# Patient Record
Sex: MALE | Race: WHITE | NOT HISPANIC OR LATINO | Employment: FULL TIME | URBAN - METROPOLITAN AREA
[De-identification: names, ages, dates, MRNs, and addresses within clinical notes are randomized per-mention and may not be internally consistent; named-entity substitution may affect disease eponyms.]

---

## 2017-10-12 ENCOUNTER — APPOINTMENT (OUTPATIENT)
Dept: RADIOLOGY | Facility: HOSPITAL | Age: 57
DRG: 336 | End: 2017-10-12
Payer: COMMERCIAL

## 2017-10-12 ENCOUNTER — APPOINTMENT (EMERGENCY)
Dept: CT IMAGING | Facility: HOSPITAL | Age: 57
DRG: 336 | End: 2017-10-12
Payer: COMMERCIAL

## 2017-10-12 ENCOUNTER — HOSPITAL ENCOUNTER (INPATIENT)
Facility: HOSPITAL | Age: 57
LOS: 10 days | Discharge: HOME | DRG: 336 | End: 2017-10-22
Attending: EMERGENCY MEDICINE | Admitting: SURGERY
Payer: COMMERCIAL

## 2017-10-12 DIAGNOSIS — K56.609 SMALL BOWEL OBSTRUCTION (HCC): ICD-10-CM

## 2017-10-12 DIAGNOSIS — R10.9 ABDOMINAL PAIN: Primary | ICD-10-CM

## 2017-10-12 DIAGNOSIS — R11.2 NAUSEA & VOMITING: ICD-10-CM

## 2017-10-12 LAB
ALBUMIN SERPL BCP-MCNC: 4.1 G/DL (ref 3.5–5)
ALP SERPL-CCNC: 60 U/L (ref 46–116)
ALT SERPL W P-5'-P-CCNC: 21 U/L (ref 12–78)
ANION GAP SERPL CALCULATED.3IONS-SCNC: 10 MMOL/L (ref 4–13)
AST SERPL W P-5'-P-CCNC: 15 U/L (ref 5–45)
BASOPHILS # BLD AUTO: 0.01 THOUSANDS/ΜL (ref 0–0.1)
BASOPHILS NFR BLD AUTO: 0 % (ref 0–1)
BILIRUB SERPL-MCNC: 0.9 MG/DL (ref 0.2–1)
BUN SERPL-MCNC: 26 MG/DL (ref 5–25)
CALCIUM SERPL-MCNC: 9.4 MG/DL (ref 8.3–10.1)
CHLORIDE SERPL-SCNC: 98 MMOL/L (ref 100–108)
CO2 SERPL-SCNC: 29 MMOL/L (ref 21–32)
CREAT SERPL-MCNC: 0.91 MG/DL (ref 0.6–1.3)
EOSINOPHIL # BLD AUTO: 0 THOUSAND/ΜL (ref 0–0.61)
EOSINOPHIL NFR BLD AUTO: 0 % (ref 0–6)
ERYTHROCYTE [DISTWIDTH] IN BLOOD BY AUTOMATED COUNT: 13.2 % (ref 11.6–15.1)
GFR SERPL CREATININE-BSD FRML MDRD: 94 ML/MIN/1.73SQ M
GLUCOSE SERPL-MCNC: 125 MG/DL (ref 65–140)
HCT VFR BLD AUTO: 46.2 % (ref 36.5–49.3)
HGB BLD-MCNC: 15.4 G/DL (ref 12–17)
LIPASE SERPL-CCNC: 81 U/L (ref 73–393)
LYMPHOCYTES # BLD AUTO: 1.05 THOUSANDS/ΜL (ref 0.6–4.47)
LYMPHOCYTES NFR BLD AUTO: 12 % (ref 14–44)
MCH RBC QN AUTO: 29.2 PG (ref 26.8–34.3)
MCHC RBC AUTO-ENTMCNC: 33.3 G/DL (ref 31.4–37.4)
MCV RBC AUTO: 88 FL (ref 82–98)
MONOCYTES # BLD AUTO: 0.56 THOUSAND/ΜL (ref 0.17–1.22)
MONOCYTES NFR BLD AUTO: 6 % (ref 4–12)
NEUTROPHILS # BLD AUTO: 7.31 THOUSANDS/ΜL (ref 1.85–7.62)
NEUTS SEG NFR BLD AUTO: 82 % (ref 43–75)
PLATELET # BLD AUTO: 281 THOUSANDS/UL (ref 149–390)
PMV BLD AUTO: 9.4 FL (ref 8.9–12.7)
POTASSIUM SERPL-SCNC: 3.8 MMOL/L (ref 3.5–5.3)
PROT SERPL-MCNC: 7.8 G/DL (ref 6.4–8.2)
RBC # BLD AUTO: 5.27 MILLION/UL (ref 3.88–5.62)
SODIUM SERPL-SCNC: 137 MMOL/L (ref 136–145)
WBC # BLD AUTO: 8.93 THOUSAND/UL (ref 4.31–10.16)

## 2017-10-12 PROCEDURE — 96374 THER/PROPH/DIAG INJ IV PUSH: CPT

## 2017-10-12 PROCEDURE — 96361 HYDRATE IV INFUSION ADD-ON: CPT

## 2017-10-12 PROCEDURE — 83690 ASSAY OF LIPASE: CPT | Performed by: EMERGENCY MEDICINE

## 2017-10-12 PROCEDURE — 80053 COMPREHEN METABOLIC PANEL: CPT | Performed by: EMERGENCY MEDICINE

## 2017-10-12 PROCEDURE — 99285 EMERGENCY DEPT VISIT HI MDM: CPT

## 2017-10-12 PROCEDURE — 85025 COMPLETE CBC W/AUTO DIFF WBC: CPT | Performed by: EMERGENCY MEDICINE

## 2017-10-12 PROCEDURE — 74177 CT ABD & PELVIS W/CONTRAST: CPT

## 2017-10-12 PROCEDURE — 74000 HB X-RAY EXAM OF ABDOMEN (SINGLE ANTEROPOSTERIOR VIEW) (PORTABLE): CPT

## 2017-10-12 PROCEDURE — 96375 TX/PRO/DX INJ NEW DRUG ADDON: CPT

## 2017-10-12 PROCEDURE — 36415 COLL VENOUS BLD VENIPUNCTURE: CPT | Performed by: EMERGENCY MEDICINE

## 2017-10-12 RX ORDER — ONDANSETRON 2 MG/ML
4 INJECTION INTRAMUSCULAR; INTRAVENOUS EVERY 4 HOURS PRN
Status: DISCONTINUED | OUTPATIENT
Start: 2017-10-12 | End: 2017-10-22 | Stop reason: HOSPADM

## 2017-10-12 RX ORDER — SODIUM CHLORIDE, SODIUM LACTATE, POTASSIUM CHLORIDE, CALCIUM CHLORIDE 600; 310; 30; 20 MG/100ML; MG/100ML; MG/100ML; MG/100ML
125 INJECTION, SOLUTION INTRAVENOUS CONTINUOUS
Status: DISCONTINUED | OUTPATIENT
Start: 2017-10-12 | End: 2017-10-14

## 2017-10-12 RX ORDER — ASPIRIN 81 MG/1
81 TABLET ORAL DAILY
COMMUNITY

## 2017-10-12 RX ORDER — LORAZEPAM 2 MG/ML
1 INJECTION INTRAMUSCULAR ONCE
Status: COMPLETED | OUTPATIENT
Start: 2017-10-12 | End: 2017-10-12

## 2017-10-12 RX ORDER — ONDANSETRON 2 MG/ML
4 INJECTION INTRAMUSCULAR; INTRAVENOUS ONCE
Status: COMPLETED | OUTPATIENT
Start: 2017-10-12 | End: 2017-10-12

## 2017-10-12 RX ORDER — ACETAMINOPHEN, ASPIRIN AND CAFFEINE 250; 250; 65 MG/1; MG/1; MG/1
1 TABLET, FILM COATED ORAL EVERY 6 HOURS PRN
COMMUNITY

## 2017-10-12 RX ORDER — KETOROLAC TROMETHAMINE 30 MG/ML
15 INJECTION, SOLUTION INTRAMUSCULAR; INTRAVENOUS ONCE
Status: COMPLETED | OUTPATIENT
Start: 2017-10-12 | End: 2017-10-12

## 2017-10-12 RX ORDER — LISINOPRIL AND HYDROCHLOROTHIAZIDE 25; 20 MG/1; MG/1
1 TABLET ORAL DAILY
COMMUNITY

## 2017-10-12 RX ORDER — DIPHENHYDRAMINE HYDROCHLORIDE 50 MG/ML
25 INJECTION INTRAMUSCULAR; INTRAVENOUS EVERY 6 HOURS PRN
Status: DISCONTINUED | OUTPATIENT
Start: 2017-10-12 | End: 2017-10-22 | Stop reason: HOSPADM

## 2017-10-12 RX ORDER — METOPROLOL TARTRATE 5 MG/5ML
2.5 INJECTION INTRAVENOUS EVERY 6 HOURS PRN
Status: DISCONTINUED | OUTPATIENT
Start: 2017-10-12 | End: 2017-10-22 | Stop reason: HOSPADM

## 2017-10-12 RX ORDER — ONDANSETRON 4 MG/1
4 TABLET, ORALLY DISINTEGRATING ORAL EVERY 6 HOURS PRN
Qty: 15 TABLET | Refills: 0 | Status: SHIPPED | OUTPATIENT
Start: 2017-10-12

## 2017-10-12 RX ADMIN — SODIUM CHLORIDE 1000 ML: 0.9 INJECTION, SOLUTION INTRAVENOUS at 09:13

## 2017-10-12 RX ADMIN — IOHEXOL 100 ML: 350 INJECTION, SOLUTION INTRAVENOUS at 06:59

## 2017-10-12 RX ADMIN — ONDANSETRON 4 MG: 2 INJECTION INTRAMUSCULAR; INTRAVENOUS at 06:15

## 2017-10-12 RX ADMIN — SODIUM CHLORIDE, SODIUM LACTATE, POTASSIUM CHLORIDE, AND CALCIUM CHLORIDE 125 ML/HR: .6; .31; .03; .02 INJECTION, SOLUTION INTRAVENOUS at 11:05

## 2017-10-12 RX ADMIN — KETOROLAC TROMETHAMINE 15 MG: 30 INJECTION, SOLUTION INTRAMUSCULAR at 06:16

## 2017-10-12 RX ADMIN — ENOXAPARIN SODIUM 40 MG: 40 INJECTION SUBCUTANEOUS at 10:18

## 2017-10-12 RX ADMIN — ONDANSETRON 4 MG: 2 INJECTION INTRAMUSCULAR; INTRAVENOUS at 16:08

## 2017-10-12 RX ADMIN — SODIUM CHLORIDE, SODIUM LACTATE, POTASSIUM CHLORIDE, AND CALCIUM CHLORIDE 125 ML/HR: .6; .31; .03; .02 INJECTION, SOLUTION INTRAVENOUS at 19:44

## 2017-10-12 RX ADMIN — Medication 1 SPRAY: at 21:47

## 2017-10-12 RX ADMIN — SODIUM CHLORIDE 1000 ML: 0.9 INJECTION, SOLUTION INTRAVENOUS at 06:12

## 2017-10-12 RX ADMIN — DIPHENHYDRAMINE HYDROCHLORIDE 25 MG: 50 INJECTION, SOLUTION INTRAMUSCULAR; INTRAVENOUS at 20:57

## 2017-10-12 RX ADMIN — ONDANSETRON 4 MG: 2 INJECTION INTRAMUSCULAR; INTRAVENOUS at 20:20

## 2017-10-12 RX ADMIN — LORAZEPAM 1 MG: 2 INJECTION INTRAMUSCULAR; INTRAVENOUS at 10:19

## 2017-10-12 NOTE — CASE MANAGEMENT
[address] [place] [address] in the [facility] by Handy Aguilar for 2017  Network Utilization Review Department  Phone: [phone]; Fax [phone]  ATTENTION: The Network Utilization Review Department is now centralized for our 7 Facilities  Make a note that we have a new phone and fax numbers for our Department  Please call with any questions or concerns to [phone] and carefully follow the prompts so that you are directed to the right person  All voicemails are confidential  Fax any determinations, approvals, denials, and requests for initial or continue stay review clinical to [phone]  Due to HIGH CALL volume, it would be easier if you could please send faxed requests to expedite your requests and in part, help us provide discharge notifications faster  Initial Clinical Review    Admission: Date/Time/Statement:   Patient admitted as IP 10/12/17 @ 0840, changed to OBSERVATION 10/12/17 @ 0843, changed to IP 10/12/17 @ 1352      10/12/17 1352  Inpatient Admission Once     Transfer Service: Surgery-General       Question Answer Comment   Admitting Physician RAJIV LEONARD    Level of Care Med Surg    Estimated length of stay More than 2 Midnights    Certification I certify that inpatient services are medically necessary for this patient for a duration of greater than two midnights  See H&P and MD Progress Notes for additional information about the patient's course of treatment                  Orders Placed This Encounter   Procedures    Inpatient Admission     Standing Status:   Standing     Number of Occurrences:   1     Order Specific Question:   Admitting Physician     Answer:   RAJIV Aldrich [[id number]]     Order Specific Question:   Level of Care     Answer:   Med Surg [[id number]]     Order Specific Question:   Estimated length of stay     Answer:   More than 2 Midnights     Order Specific Question:   Certification     Answer:   I certify that inpatient services are medically necessary for this patient for a duration of greater than two midnights  See H&P and MD Progress Notes for additional information about the patient's course of treatment   Place in Observation (expected length of stay for this patient is less than two midnights)     Standing Status:   Standing     Number of Occurrences:   1     Order Specific Question:   Admitting Physician     Answer:   Polina Johnson [[id number]]     Order Specific Question:   Level of Care     Answer:   Med Surg [16]         ED: Date/Time/Mode of Arrival:   ED Arrival Information     Expected Arrival Acuity Means of Arrival Escorted By Service Admission Type    - 10/12/2017 05:21 Urgent Walk-In Self Surgery-General Urgent    Arrival Complaint    Stomach pain          Chief Complaint:   Chief Complaint   Patient presents with    Abdominal Pain     pt  states he eat some spaghetti 2 days ago and he has been nauseous, vomiting and havind some stomach ache ever since, pt  denies having any diarrhea       History of Illness: Ang Geiger is a 65y o  year old male with h/o HTN, appy who presents to ED complaining of epigastric abdominal pain x2 days associated with nausea and vomiting  Associated symptoms are decreased urine output, sweats, chills and no appetite  Previous surgeries include appendectomy  Last colonoscopy was 1-2 years ago and negative        ED Vital Signs:   ED Triage Vitals [10/12/17 0533]   Temperature Pulse Respirations Blood Pressure SpO2   98 8 °F (37 1 °C) 78 18 (!) 168/108 99 %      Temp Source Heart Rate Source Patient Position - Orthostatic VS BP Location FiO2 (%)   Oral Monitor Lying Right arm --      Pain Score       6        Wt Readings from Last 1 Encounters:   10/12/17 93 kg (205 lb)       Vital Signs (abnormal):    98 7 °F (37 1 °C)  76  18  140/82  98 %  None (Room air)         Abnormal Labs/Diagnostic Test Results:   LABS - cl 98, bun26,   ED Treatment:   Medication Administration from 10/12/2017 0521 to 10/12/2017 7968       Date/Time Order Dose Route Action Action by Comments     10/12/2017 0840 sodium chloride 0 9 % bolus 1,000 mL 0 mL Intravenous Stopped Edi Dillard RN      10/12/2017 0612 sodium chloride 0 9 % bolus 1,000 mL 1,000 mL Intravenous New [address] [facility] Gosselin, RN      10/12/2017 0615 ondansetron (ZOFRAN) injection 4 mg 4 mg Intravenous Given [facility] Gosselin, RN      10/12/2017 0616 ketorolac (TORADOL) 30 mg/mL injection 15 mg 15 mg Intravenous Given Gail Drew RN      10/12/2017 0659 iohexol (OMNIPAQUE) 350 MG/ML injection (MULTI-DOSE) 100 mL 100 mL Intravenous Given [facility]      10/12/2017 0913 sodium chloride 0 9 % bolus 1,000 mL 1,000 mL Intravenous New Bag Edi Dillard RN           Past Medical/Surgical History: Active Ambulatory Problems     Diagnosis Date Noted    No Active Ambulatory Problems     Resolved Ambulatory Problems     Diagnosis Date Noted    No Resolved Ambulatory Problems     Past Medical History:   Diagnosis Date    Hypertension     Migraine        Admitting Diagnosis: Small bowel obstruction [K56 609]  Abdominal pain [R10 9]  Nausea & vomiting [R11 2]    Age/Sex: 65 y o  male    Assessment/Plan: ASSESSMENT/PLAN:     SBO     NGT  IVF  OOB and ambulate  IS   Pain control   Ck AM obstruction series  IV HTN meds for now      This patient will require  >2 night hospital stay      Admission Orders:  Admit to med surg, NGT to LCS, oob as tolerated, encourage C&DB, IS q1h, NPO      Scheduled Meds:   enoxaparin 40 mg Subcutaneous Daily     Continuous Infusions:   lactated ringers 125 mL/hr Last Rate: 125 mL/hr (10/12/17 1105)     PRN Meds: HYDROmorphone    HYDROmorphone    metoprolol    ondansetron

## 2017-10-12 NOTE — ED PROVIDER NOTES
History  Chief Complaint   Patient presents with    Abdominal Pain     pt  states he eat some spaghetti 2 days ago and he has been nauseous, vomiting and havind some stomach ache ever since, pt  denies having any diarrhea       History provided by:  Patient and spouse   used: No     52-year-old male presents with 2 days of lower abdominal pain, or intensity without radiation associated with nausea  States he has been forcing himself to vomit but has not been making him feel better  He has had no bowel movements but has also not had anything solid to eat  Just a few sips of water  Recalls eating spaghetti from a restaurant in [address] [place] [address] the day prior to this, bringing it home and reheating in a Styrofoam container which had melted upon reheating  No other sick contacts  Subjective fever and chills  History of appendectomy  No other abdominal surgeries  Otherwise healthy  On exam here he seems a little uncomfortable he does have bilateral lower quadrant abdominal tenderness without rebound or guarding  Differential diagnosis includes viral illness, food-borne gastroenteritis, small-bowel obstruction  Will check labs, give fluids, control symptoms and CT  Prior to Admission Medications   Prescriptions Last Dose Informant Patient Reported? Taking?   aspirin (ECOTRIN LOW STRENGTH) 81 mg EC tablet   Yes Yes   Sig: Take 81 mg by mouth daily   aspirin-acetaminophen-caffeine (EXCEDRIN MIGRAINE) 250-250-65 MG per tablet   Yes Yes   Sig: Take 1 tablet by mouth every 6 (six) hours as needed for headaches   lisinopril-hydrochlorothiazide (PRINZIDE,ZESTORETIC) 20-25 MG per tablet   Yes Yes   Sig: Take 1 tablet by mouth daily      Facility-Administered Medications: None       Past Medical History:   Diagnosis Date    Hypertension     Migraine        Past Surgical History:   Procedure Laterality Date    APPENDECTOMY      CATARACT EXTRACTION      COLONOSCOPY         History reviewed   No pertinent family history  I have reviewed and agree with the history as documented  Social History   Substance Use Topics    Smoking status: Never Smoker    Smokeless tobacco: Not on file    Alcohol use Yes      Comment: occasionally        Review of Systems   Constitutional: Positive for activity change, appetite change and fatigue  Respiratory: Negative for chest tightness and shortness of breath  Gastrointestinal: Positive for abdominal pain, constipation, nausea and vomiting  Genitourinary: Negative for difficulty urinating, dysuria and flank pain  Skin: Negative for color change and pallor  Neurological: Negative for dizziness, weakness and headaches  All other systems reviewed and are negative  Physical Exam  ED Triage Vitals [10/12/17 0533]   Temperature Pulse Respirations Blood Pressure SpO2   98 8 °F (37 1 °C) 78 18 (!) 168/108 99 %      Temp Source Heart Rate Source Patient Position - Orthostatic VS BP Location FiO2 (%)   Oral Monitor Lying Right arm --      Pain Score       6           Physical Exam   Constitutional: He is oriented to person, place, and time  He appears well-developed and well-nourished  HENT:   Head: Normocephalic and atraumatic  Neck: Normal range of motion  Neck supple  Cardiovascular: Normal rate and regular rhythm  Pulmonary/Chest: Effort normal and breath sounds normal    Abdominal: Soft  He exhibits no distension  Tenderness in both lower quadrants w/o rebound/guarding  Musculoskeletal: Normal range of motion  He exhibits no edema  Neurological: He is alert and oriented to person, place, and time  Skin: Skin is warm and dry  Psychiatric: He has a normal mood and affect  His behavior is normal    Nursing note and vitals reviewed        ED Medications  Medications   sodium chloride 0 9 % bolus 1,000 mL (not administered)   lactated ringers infusion (not administered)   ondansetron (ZOFRAN) injection 4 mg (not administered)   enoxaparin (LOVENOX) subcutaneous injection 40 mg (not administered)   HYDROmorphone (DILAUDID) 1 mg/mL injection 0 5 mg (not administered)   HYDROmorphone (DILAUDID) 1 mg/mL injection 1 mg (not administered)   sodium chloride 0 9 % bolus 1,000 mL (0 mL Intravenous Stopped 10/12/17 0840)   ondansetron (ZOFRAN) injection 4 mg (4 mg Intravenous Given 10/12/17 0615)   ketorolac (TORADOL) 30 mg/mL injection 15 mg (15 mg Intravenous Given 10/12/17 0616)   iohexol (OMNIPAQUE) 350 MG/ML injection (MULTI-DOSE) 100 mL (100 mL Intravenous Given 10/12/17 0659)       Diagnostic Studies  Labs Reviewed   CBC AND DIFFERENTIAL - Abnormal        Result Value Ref Range Status    Neutrophils Relative 82 (*) 43 - 75 % Final    Lymphocytes Relative 12 (*) 14 - 44 % Final    WBC 8 93  4 31 - 10 16 Thousand/uL Final    RBC 5 27  3 88 - 5 62 Million/uL Final    Hemoglobin 15 4  12 0 - 17 0 g/dL Final    Hematocrit 46 2  36 5 - 49 3 % Final    MCV 88  82 - 98 fL Final    MCH 29 2  26 8 - 34 3 pg Final    MCHC 33 3  31 4 - 37 4 g/dL Final    RDW 13 2  11 6 - 15 1 % Final    MPV 9 4  8 9 - 12 7 fL Final    Platelets 589  364 - 390 Thousands/uL Final    Monocytes Relative 6  4 - 12 % Final    Eosinophils Relative 0  0 - 6 % Final    Basophils Relative 0  0 - 1 % Final    Neutrophils Absolute 7 31  1 85 - 7 62 Thousands/µL Final    Lymphocytes Absolute 1 05  0 60 - 4 47 Thousands/µL Final    Monocytes Absolute 0 56  0 17 - 1 22 Thousand/µL Final    Eosinophils Absolute 0 00  0 00 - 0 61 Thousand/µL Final    Basophils Absolute 0 01  0 00 - 0 10 Thousands/µL Final   COMPREHENSIVE METABOLIC PANEL - Abnormal     Chloride 98 (*) 100 - 108 mmol/L Final    BUN 26 (*) 5 - 25 mg/dL Final    Sodium 137  136 - 145 mmol/L Final    Potassium 3 8  3 5 - 5 3 mmol/L Final    CO2 29  21 - 32 mmol/L Final    Anion Gap 10  4 - 13 mmol/L Final    Creatinine 0 91  0 60 - 1 30 mg/dL Final    Comment: Standardized to IDMS reference method    Glucose 125  65 - 140 mg/dL Final    Comment: If the patient is fasting, the ADA then defines impaired fasting glucose as > 100 mg/dL and diabetes as > or equal to 123 mg/dL  Specimen collection should occur prior to Sulfasalazine administration due to the potential for falsely depressed results  Specimen collection should occur prior to Sulfapyridine administration due to the potential for falsely elevated results  Calcium 9 4  8 3 - 10 1 mg/dL Final    AST 15  5 - 45 U/L Final    Comment:   Specimen collection should occur prior to Sulfasalazine administration due to the potential for falsely depressed results  ALT 21  12 - 78 U/L Final    Comment:   Specimen collection should occur prior to Sulfasalazine administration due to the potential for falsely depressed results  Alkaline Phosphatase 60  46 - 116 U/L Final    Total Protein 7 8  6 4 - 8 2 g/dL Final    Albumin 4 1  3 5 - 5 0 g/dL Final    Total Bilirubin 0 90  0 20 - 1 00 mg/dL Final    eGFR 94  ml/min/1 73sq m Final    Narrative:     National Kidney Disease Education Program recommendations are as follows:  GFR calculation is accurate only with a steady state creatinine  Chronic Kidney disease less than 60 ml/min/1 73 sq  meters  Kidney failure less than 15 ml/min/1 73 sq  meters  LIPASE - Normal    Lipase 81  73 - 393 u/L Final   PLATELET COUNT       CT abdomen pelvis with contrast   Final Result      1  Dilated mid small bowel loops with decompressed distal loops  The findings are compatible with small bowel obstruction  Findings discussed with Dr Sandeep Parr at 0730 hours on 10/12/2017  2   Moderate amount of feces in the colon           Workstation performed: [id number]         XR ABDOMEN OBSTRUCTION SERIES    (Results Pending)       Procedures  Procedures      Phone Contacts  ED Phone Contact    ED Course  ED Course as of Oct 12 0843   Thu Oct 12, 2017   0152 This case was discussed with the surgical resident who will be down to evaluate the patient in the emergency department  9670 Patient to be admitted to the surgical service  [facility]  CritCare Time    Disposition  Final diagnoses:   Abdominal pain   Nausea & vomiting   Small bowel obstruction     ED Disposition     ED Disposition Condition Comment    Admit  Case was discussed with Darwin-Surgical Resident and the patient's admission status was agreed to be Admission Status: observation status to the service of Dr Easton Reis    None       Patient's Medications   Discharge Prescriptions    ONDANSETRON (ZOFRAN-ODT) 4 MG DISINTEGRATING TABLET    Take 1 tablet by mouth every 6 (six) hours as needed for nausea or vomiting       Start Date: 10/12/2017End Date: --       Order Dose: 4 mg       Quantity: 15 tablet    Refills: 0     No discharge procedures on file      ED Provider  Electronically Signed by       Chana Cortez MD  10/12/17 0310

## 2017-10-12 NOTE — H&P
History and Physical -General Surgery  Stormy Escobar 65 y o  male MRN: [id number]  Unit/Bed#: ED 12 Encounter: [id number]        Reason for Consult / Principal Problem: abdominal pain    HPI: Stormy Escobar is a 65y o  year old male with h/o HTN, appy who presents to ED complaining of epigastric abdominal pain x2 days associated with nausea and vomiting  Associated symptoms are decreased urine output, sweats, chills and no appetite  Previous surgeries include appendectomy  Last colonoscopy was 1-2 years ago and negative  CT A/P shows dilated mid small bowel loops with decompressed distal loops  The findings are compatible with small bowel obstruction  Review of Systems   Constitutional: Positive for activity change, appetite change, chills and diaphoresis  Respiratory: Negative for shortness of breath  Cardiovascular: Negative for chest pain  Gastrointestinal: Positive for abdominal pain, nausea and vomiting  Negative for abdominal distention, constipation and diarrhea  Skin: Negative for color change  Historical Information   Past Medical History:   Diagnosis Date    Hypertension     Migraine      Past Surgical History:   Procedure Laterality Date    APPENDECTOMY      CATARACT EXTRACTION Bilateral     COLONOSCOPY      NERVE SURGERY      B radial nerve transposition     Social History   History   Alcohol Use    Yes     Comment: occasionally     History   Drug Use No     History   Smoking Status    Never Smoker   Smokeless Tobacco    Never Used     History reviewed  No pertinent family history      Meds/Allergies     Home medications:   Lisinopril/HCTZ 20/25 mg  ASA 81 mg    Current Facility-Administered Medications   Medication Dose Route Frequency    enoxaparin (LOVENOX) subcutaneous injection 40 mg  40 mg Subcutaneous Daily    HYDROmorphone (DILAUDID) 1 mg/mL injection 0 5 mg  0 5 mg Intravenous Q2H PRN    HYDROmorphone (DILAUDID) 1 mg/mL injection 1 mg  1 mg Intravenous Q3H PRN  lactated ringers infusion  125 mL/hr Intravenous Continuous    ondansetron (ZOFRAN) injection 4 mg  4 mg Intravenous Q4H PRN    sodium chloride 0 9 % bolus 1,000 mL  1,000 mL Intravenous Once       No Known Allergies    Objective     Blood pressure (!) 168/108, pulse 78, temperature 98 8 °F (37 1 °C), temperature source Oral, resp  rate 18, weight 93 kg (205 lb), SpO2 99 %    No intake or output data in the 24 hours ending 10/12/17 0855    PHYSICAL EXAM  General appearance: alert and no distress  Skin: Skin color, texture, turgor normal  No rashes or lesions  Head: Normocephalic, without obvious abnormality  Heart: regular rate and rhythm, S1, S2 normal, no murmur, click, rub or gallop  Lungs: clear to auscultation bilaterally  Abdomen: soft and flat, tender in suprapubic area, no masses, +BS  Back: negative  Rectal: deferred  Neurological: normal without focal findings    Lab Results:   Admission on 10/12/2017   Component Date Value    WBC 10/12/2017 8 93     RBC 10/12/2017 5 27     Hemoglobin 10/12/2017 15 4     Hematocrit 10/12/2017 46 2     MCV 10/12/2017 88     MCH 10/12/2017 29 2     MCHC 10/12/2017 33 3     RDW 10/12/2017 13 2     MPV 10/12/2017 9 4     Platelets 54/06/5300 281     Neutrophils Relative 10/12/2017 82*    Lymphocytes Relative 10/12/2017 12*    Monocytes Relative 10/12/2017 6     Eosinophils Relative 10/12/2017 0     Basophils Relative 10/12/2017 0     Neutrophils Absolute 10/12/2017 7 31     Lymphocytes Absolute 10/12/2017 1 05     Monocytes Absolute 10/12/2017 0 56     Eosinophils Absolute 10/12/2017 0 00     Basophils Absolute 10/12/2017 0 01     Sodium 10/12/2017 137     Potassium 10/12/2017 3 8     Chloride 10/12/2017 98*    CO2 10/12/2017 29     Anion Gap 10/12/2017 10     BUN 10/12/2017 26*    Creatinine 10/12/2017 0 91     Glucose 10/12/2017 125     Calcium 10/12/2017 9 4     AST 10/12/2017 15     ALT 10/12/2017 21     Alkaline Phosphatase 10/12/2017 60     Total Protein 10/12/2017 7 8     Albumin 10/12/2017 4 1     Total Bilirubin 10/12/2017 0 90     eGFR 10/12/2017 94     Lipase 10/12/2017 81      Imaging Studies: I have personally reviewed pertinent reports  ASSESSMENT/PLAN:    SBO    NGT  IVF  OOB and ambulate  IS   Pain control   Ck AM obstruction series  IV HTN meds for now  This patient will require  >2 night hospital stay  Counseling / Coordination of Care  Total time spent today  30 minutes  Greater than 50% of total time was spent with the patient and / or family counseling and / or coordination of care

## 2017-10-13 ENCOUNTER — APPOINTMENT (INPATIENT)
Dept: RADIOLOGY | Facility: HOSPITAL | Age: 57
DRG: 336 | End: 2017-10-13
Payer: COMMERCIAL

## 2017-10-13 LAB
ANION GAP SERPL CALCULATED.3IONS-SCNC: 7 MMOL/L (ref 4–13)
BASOPHILS # BLD AUTO: 0.01 THOUSANDS/ΜL (ref 0–0.1)
BASOPHILS NFR BLD AUTO: 0 % (ref 0–1)
BUN SERPL-MCNC: 26 MG/DL (ref 5–25)
CALCIUM SERPL-MCNC: 8.8 MG/DL (ref 8.3–10.1)
CHLORIDE SERPL-SCNC: 103 MMOL/L (ref 100–108)
CO2 SERPL-SCNC: 28 MMOL/L (ref 21–32)
CREAT SERPL-MCNC: 0.86 MG/DL (ref 0.6–1.3)
EOSINOPHIL # BLD AUTO: 0.02 THOUSAND/ΜL (ref 0–0.61)
EOSINOPHIL NFR BLD AUTO: 0 % (ref 0–6)
ERYTHROCYTE [DISTWIDTH] IN BLOOD BY AUTOMATED COUNT: 13.2 % (ref 11.6–15.1)
GFR SERPL CREATININE-BSD FRML MDRD: 97 ML/MIN/1.73SQ M
GLUCOSE SERPL-MCNC: 101 MG/DL (ref 65–140)
HCT VFR BLD AUTO: 44.8 % (ref 36.5–49.3)
HGB BLD-MCNC: 14.5 G/DL (ref 12–17)
LYMPHOCYTES # BLD AUTO: 0.95 THOUSANDS/ΜL (ref 0.6–4.47)
LYMPHOCYTES NFR BLD AUTO: 12 % (ref 14–44)
MAGNESIUM SERPL-MCNC: 2 MG/DL (ref 1.6–2.6)
MCH RBC QN AUTO: 28.7 PG (ref 26.8–34.3)
MCHC RBC AUTO-ENTMCNC: 32.4 G/DL (ref 31.4–37.4)
MCV RBC AUTO: 89 FL (ref 82–98)
MONOCYTES # BLD AUTO: 0.8 THOUSAND/ΜL (ref 0.17–1.22)
MONOCYTES NFR BLD AUTO: 10 % (ref 4–12)
NEUTROPHILS # BLD AUTO: 5.91 THOUSANDS/ΜL (ref 1.85–7.62)
NEUTS SEG NFR BLD AUTO: 78 % (ref 43–75)
PHOSPHATE SERPL-MCNC: 3.5 MG/DL (ref 2.7–4.5)
PLATELET # BLD AUTO: 254 THOUSANDS/UL (ref 149–390)
PMV BLD AUTO: 9.6 FL (ref 8.9–12.7)
POTASSIUM SERPL-SCNC: 3.4 MMOL/L (ref 3.5–5.3)
RBC # BLD AUTO: 5.05 MILLION/UL (ref 3.88–5.62)
SODIUM SERPL-SCNC: 138 MMOL/L (ref 136–145)
WBC # BLD AUTO: 7.69 THOUSAND/UL (ref 4.31–10.16)

## 2017-10-13 PROCEDURE — 80048 BASIC METABOLIC PNL TOTAL CA: CPT | Performed by: SURGERY

## 2017-10-13 PROCEDURE — 84100 ASSAY OF PHOSPHORUS: CPT | Performed by: SURGERY

## 2017-10-13 PROCEDURE — 85025 COMPLETE CBC W/AUTO DIFF WBC: CPT | Performed by: SURGERY

## 2017-10-13 PROCEDURE — 74022 RADEX COMPL AQT ABD SERIES: CPT

## 2017-10-13 PROCEDURE — 83735 ASSAY OF MAGNESIUM: CPT | Performed by: SURGERY

## 2017-10-13 RX ORDER — POTASSIUM CHLORIDE 14.9 MG/ML
20 INJECTION INTRAVENOUS
Status: COMPLETED | OUTPATIENT
Start: 2017-10-13 | End: 2017-10-13

## 2017-10-13 RX ORDER — METOCLOPRAMIDE HYDROCHLORIDE 5 MG/ML
10 INJECTION INTRAMUSCULAR; INTRAVENOUS EVERY 6 HOURS PRN
Status: DISCONTINUED | OUTPATIENT
Start: 2017-10-13 | End: 2017-10-22 | Stop reason: HOSPADM

## 2017-10-13 RX ADMIN — POTASSIUM CHLORIDE 20 MEQ: 200 INJECTION, SOLUTION INTRAVENOUS at 09:19

## 2017-10-13 RX ADMIN — Medication 1 SPRAY: at 17:13

## 2017-10-13 RX ADMIN — METOCLOPRAMIDE 10 MG: 5 INJECTION, SOLUTION INTRAMUSCULAR; INTRAVENOUS at 14:10

## 2017-10-13 RX ADMIN — METOCLOPRAMIDE 10 MG: 5 INJECTION, SOLUTION INTRAMUSCULAR; INTRAVENOUS at 07:58

## 2017-10-13 RX ADMIN — ENOXAPARIN SODIUM 40 MG: 40 INJECTION SUBCUTANEOUS at 08:01

## 2017-10-13 RX ADMIN — DIPHENHYDRAMINE HYDROCHLORIDE 25 MG: 50 INJECTION, SOLUTION INTRAMUSCULAR; INTRAVENOUS at 04:58

## 2017-10-13 RX ADMIN — ONDANSETRON 4 MG: 2 INJECTION INTRAMUSCULAR; INTRAVENOUS at 16:10

## 2017-10-13 RX ADMIN — POTASSIUM CHLORIDE 20 MEQ: 200 INJECTION, SOLUTION INTRAVENOUS at 11:32

## 2017-10-13 RX ADMIN — SODIUM CHLORIDE, SODIUM LACTATE, POTASSIUM CHLORIDE, AND CALCIUM CHLORIDE 125 ML/HR: .6; .31; .03; .02 INJECTION, SOLUTION INTRAVENOUS at 11:24

## 2017-10-13 RX ADMIN — SODIUM CHLORIDE, SODIUM LACTATE, POTASSIUM CHLORIDE, AND CALCIUM CHLORIDE 125 ML/HR: .6; .31; .03; .02 INJECTION, SOLUTION INTRAVENOUS at 03:01

## 2017-10-13 RX ADMIN — ONDANSETRON 4 MG: 2 INJECTION INTRAMUSCULAR; INTRAVENOUS at 00:47

## 2017-10-13 RX ADMIN — ONDANSETRON 4 MG: 2 INJECTION INTRAMUSCULAR; INTRAVENOUS at 04:58

## 2017-10-13 RX ADMIN — Medication 1 SPRAY: at 05:00

## 2017-10-13 RX ADMIN — METOCLOPRAMIDE 10 MG: 5 INJECTION, SOLUTION INTRAMUSCULAR; INTRAVENOUS at 19:53

## 2017-10-13 RX ADMIN — Medication 1 SPRAY: at 00:47

## 2017-10-13 NOTE — PROGRESS NOTES
Progress Note -Surgery DIAMOND Frost 65 y o  male MRN: [id number]  Unit/Bed#: -01 Encounter: [id number]      Subjective/Objective     Subjective:  Patient states that he has been nauseous overnight  NG tube output 2 L in last 24 hours  Patient is not passing gas or having bowel movements      Objective:     /92   Pulse 91   Temp 98 1 °F (36 7 °C) (Oral)   Resp 20   Wt 93 kg (205 lb)   SpO2 94%       Intake/Output Summary (Last 24 hours) at 10/13/17 0837  Last data filed at 10/13/17 0715   Gross per 24 hour   Intake          1960 42 ml   Output             2075 ml   Net          -114 58 ml       Invasive Devices     Peripheral Intravenous Line            Peripheral IV 10/12/17 Left Antecubital 1 day          Drain            NG/OG/Enteral Tube Nasogastric 16 Fr Right nares less than 1 day                Physical Exam:  General appearance: alert, appears stated age and cooperative  Lungs: clear to auscultation bilaterally  Heart: regular rate and rhythm, S1, S2 normal, no murmur, click, rub or gallop  Abdomen:  Mild distention, nontender to palpation, bowel sounds present      Current Facility-Administered Medications:     diphenhydrAMINE (BENADRYL) injection 25 mg, 25 mg, Intravenous, Q6H PRN, Kandy Cespedes PA-C, 25 mg at 10/13/17 0458    enoxaparin (LOVENOX) subcutaneous injection 40 mg, 40 mg, Subcutaneous, Daily, Vi Kaba MD, 40 mg at 10/13/17 0801    HYDROmorphone (DILAUDID) 1 mg/mL injection 0 5 mg, 0 5 mg, Intravenous, Q2H PRN, Vi Kaba MD    HYDROmorphone (DILAUDID) 1 mg/mL injection 1 mg, 1 mg, Intravenous, Q3H PRN, Vi Kaba MD    lactated ringers infusion, 125 mL/hr, Intravenous, Continuous, Vi Kaba MD, Last Rate: 125 mL/hr at 10/13/17 0301, 125 mL/hr at 10/13/17 0301    metoclopramide (REGLAN) injection 10 mg, 10 mg, Intravenous, Q6H PRN, Chloe Ocampo PA-C, 10 mg at 10/13/17 0758    metoprolol (LOPRESSOR) injection 2 5 mg, 2 5 mg, Intravenous, Q6H PRN, Shaheen Perdomo PA-C    ondansetron [facility]) injection 4 mg, 4 mg, Intravenous, Q4H PRN, Krystal Welsh MD, 4 mg at 10/13/17 0458    phenol (CHLORASEPTIC) 1 4 % mucosal liquid 1 spray, 1 spray, Mouth/Throat, Q2H PRN, Alla Brooks PA-C, 1 spray at 10/13/17 0500    potassium chloride 20 mEq IVPB (premix), 20 mEq, Intravenous, Q2H, Krystal Welsh MD              Lab, Imaging and other studies:  I have personally reviewed pertinent lab results  , CBC:   Lab Results   Component Value Date    WBC 7 69 10/13/2017    HGB 14 5 10/13/2017    HCT 44 8 10/13/2017    MCV 89 10/13/2017     10/13/2017    MCH 28 7 10/13/2017    MCHC 32 4 10/13/2017    RDW 13 2 10/13/2017    MPV 9 6 10/13/2017   , CMP:   Lab Results   Component Value Date     10/13/2017    K 3 4 (L) 10/13/2017     10/13/2017    CO2 28 10/13/2017    ANIONGAP 7 10/13/2017    BUN 26 (H) 10/13/2017    CREATININE 0 86 10/13/2017    GLUCOSE 101 10/13/2017    CALCIUM 8 8 10/13/2017    EGFR 97 10/13/2017     Labs in chart were reviewed  Lab Results   Component Value Date    WBC 7 69 10/13/2017    HGB 14 5 10/13/2017    HCT 44 8 10/13/2017     10/13/2017     Lab Results   Component Value Date     10/13/2017    K 3 4 (L) 10/13/2017     10/13/2017    CO2 28 10/13/2017    BUN 26 (H) 10/13/2017    CREATININE 0 86 10/13/2017    GLUCOSE 101 10/13/2017       VTE Pharmacologic Prophylaxis: Enoxaparin (Lovenox)  VTE Mechanical Prophylaxis: sequential compression device    Assessment:  50-year-old male with small-bowel obstruction    Plan:  -will continue NG tube  -IV fluids/pain control  -follow-up final read of obstruction series from this a m     We will order  -encourage out of bed/ambulation  -DVT prophylaxis    Kathrin Fields PA-C

## 2017-10-13 NOTE — MEDICAL STUDENT
MEDICAL STUDENT  Inpatient Progress Note for TRAINING ONLY  Not Part of Legal Medical Record       Progress Note - Heather Dugan 65 y o  male MRN: [id number]    Unit/Bed#: -01 Encounter: [id number]      Assessment:  SBO    Plan:  Monitor NGT  IVF  OOB and ambulate  Encourage IS use  Continue IV HTN medications  Pain control  NPO  Review obstruction series results from this morning    Subjective:   severe nausea - taking Zofran Q4 hours with no relief  (+) nausea, and one episode of vomiting around his NGT this morning   (-) BM/flatus  Took two walks around the floor yesterday  (-) IS use    Objective:     Vitals: Blood pressure 140/92, pulse 91, temperature 98 1 °F (36 7 °C), temperature source Oral, resp  rate 20, weight 93 kg (205 lb), SpO2 94 %  ,There is no height or weight on file to calculate BMI  Intake/Output Summary (Last 24 hours) at 10/13/17 4086  Last data filed at 10/13/17 0715   Gross per 24 hour   Intake          1960 42 ml   Output             2075 ml   Net          -114 58 ml     NGT output:   10/12/17: 975 ml  10/13/17 (as of 0800) : 500 ml    Physical Exam: General appearance: alert, appears stated age, cooperative and moderate distress  Lungs: clear to auscultation bilaterally  Heart: regular rate and rhythm, S1, S2 normal, no murmur, click, rub or gallop  Abdomen: Abdomen is mildly distended  Patient is tender to palpation of the RLQ and LLQs  Hypoactive bowel sounds noted in all four quadrants  Extremities: extremities normal, atraumatic, no cyanosis or edema     Invasive Devices     Peripheral Intravenous Line            Peripheral IV 10/12/17 Left Antecubital 1 day          Drain            NG/OG/Enteral Tube Nasogastric 16 Fr Right nares less than 1 day                Lab, Imaging and other studies: I have personally reviewed pertinent reports      VTE Pharmacologic Prophylaxis: Enoxaparin (Lovenox)

## 2017-10-14 ENCOUNTER — APPOINTMENT (INPATIENT)
Dept: RADIOLOGY | Facility: HOSPITAL | Age: 57
DRG: 336 | End: 2017-10-14
Payer: COMMERCIAL

## 2017-10-14 LAB
ANION GAP SERPL CALCULATED.3IONS-SCNC: 7 MMOL/L (ref 4–13)
BUN SERPL-MCNC: 25 MG/DL (ref 5–25)
CALCIUM SERPL-MCNC: 8.5 MG/DL (ref 8.3–10.1)
CHLORIDE SERPL-SCNC: 104 MMOL/L (ref 100–108)
CO2 SERPL-SCNC: 29 MMOL/L (ref 21–32)
CREAT SERPL-MCNC: 0.77 MG/DL (ref 0.6–1.3)
ERYTHROCYTE [DISTWIDTH] IN BLOOD BY AUTOMATED COUNT: 13.1 % (ref 11.6–15.1)
GFR SERPL CREATININE-BSD FRML MDRD: 101 ML/MIN/1.73SQ M
GLUCOSE SERPL-MCNC: 102 MG/DL (ref 65–140)
HCT VFR BLD AUTO: 41.6 % (ref 36.5–49.3)
HGB BLD-MCNC: 13.6 G/DL (ref 12–17)
MCH RBC QN AUTO: 29.2 PG (ref 26.8–34.3)
MCHC RBC AUTO-ENTMCNC: 32.7 G/DL (ref 31.4–37.4)
MCV RBC AUTO: 89 FL (ref 82–98)
PLATELET # BLD AUTO: 217 THOUSANDS/UL (ref 149–390)
PMV BLD AUTO: 9.5 FL (ref 8.9–12.7)
POTASSIUM SERPL-SCNC: 3.6 MMOL/L (ref 3.5–5.3)
RBC # BLD AUTO: 4.66 MILLION/UL (ref 3.88–5.62)
SODIUM SERPL-SCNC: 140 MMOL/L (ref 136–145)
WBC # BLD AUTO: 7.12 THOUSAND/UL (ref 4.31–10.16)

## 2017-10-14 PROCEDURE — C9113 INJ PANTOPRAZOLE SODIUM, VIA: HCPCS | Performed by: SURGERY

## 2017-10-14 PROCEDURE — 85027 COMPLETE CBC AUTOMATED: CPT | Performed by: SURGERY

## 2017-10-14 PROCEDURE — 74022 RADEX COMPL AQT ABD SERIES: CPT

## 2017-10-14 PROCEDURE — 80048 BASIC METABOLIC PNL TOTAL CA: CPT | Performed by: SURGERY

## 2017-10-14 RX ORDER — POTASSIUM CHLORIDE 14.9 MG/ML
20 INJECTION INTRAVENOUS
Status: COMPLETED | OUTPATIENT
Start: 2017-10-14 | End: 2017-10-19

## 2017-10-14 RX ORDER — DEXTROSE, SODIUM CHLORIDE, AND POTASSIUM CHLORIDE 5; .45; .15 G/100ML; G/100ML; G/100ML
125 INJECTION INTRAVENOUS CONTINUOUS
Status: DISCONTINUED | OUTPATIENT
Start: 2017-10-14 | End: 2017-10-20

## 2017-10-14 RX ORDER — PANTOPRAZOLE SODIUM 40 MG/1
40 INJECTION, POWDER, FOR SOLUTION INTRAVENOUS
Status: DISCONTINUED | OUTPATIENT
Start: 2017-10-14 | End: 2017-10-21

## 2017-10-14 RX ADMIN — DEXTROSE, SODIUM CHLORIDE, AND POTASSIUM CHLORIDE 125 ML/HR: 5; .45; .15 INJECTION INTRAVENOUS at 11:39

## 2017-10-14 RX ADMIN — DEXTROSE, SODIUM CHLORIDE, AND POTASSIUM CHLORIDE 125 ML/HR: 5; .45; .15 INJECTION INTRAVENOUS at 20:10

## 2017-10-14 RX ADMIN — ENOXAPARIN SODIUM 40 MG: 40 INJECTION SUBCUTANEOUS at 08:08

## 2017-10-14 RX ADMIN — POTASSIUM CHLORIDE 20 MEQ: 200 INJECTION, SOLUTION INTRAVENOUS at 11:39

## 2017-10-14 RX ADMIN — POTASSIUM CHLORIDE 20 MEQ: 200 INJECTION, SOLUTION INTRAVENOUS at 13:44

## 2017-10-14 RX ADMIN — PANTOPRAZOLE SODIUM 40 MG: 40 INJECTION, POWDER, FOR SOLUTION INTRAVENOUS at 11:39

## 2017-10-14 RX ADMIN — SODIUM CHLORIDE, SODIUM LACTATE, POTASSIUM CHLORIDE, AND CALCIUM CHLORIDE 1000 ML: .6; .31; .03; .02 INJECTION, SOLUTION INTRAVENOUS at 09:38

## 2017-10-14 NOTE — PROGRESS NOTES
Progress Note - General Surgery   Heather Dugan 65 y o  male MRN: [id number]  Unit/Bed#: -01 Encounter: [id number]    Assessment:  66 yo M with SBO likely 2/2 adhesions    Plan:  SBO- con't NPO/NGT for another 24 hrs  Would expect him to open up over the next 24 hrs, but may need operative intervention if not  Will recheck OBS tomorrow  Give 1L bolus for tachycardia and low UOP    HTN- hold home meds, con't to monitor    PPX- lovenox, will add protonix as well  Subjective/Objective   Chief Complaint: abd pain    Subjective: still with abd soreness, not feeling great  Very small flatus, no BM  Mild nausea with ice chips  Objective:     Blood pressure 137/84, pulse 72, temperature 98 9 °F (37 2 °C), temperature source Oral, resp  rate 18, weight 93 kg (205 lb), SpO2 96 %  ,There is no height or weight on file to calculate BMI  Intake/Output Summary (Last 24 hours) at 10/14/17 1050  Last data filed at 10/14/17 0933   Gross per 24 hour   Intake              180 ml   Output             1650 ml   Net            -1470 ml       Invasive Devices     Peripheral Intravenous Line            Peripheral IV 10/12/17 Left Antecubital 2 days          Drain            NG/OG/Enteral Tube Nasogastric 16 Fr Right nares 2 days                Physical Exam:   gen- in bed, tired appearing  Heart- tachy to low 100s  Lungs- clear  abd- soft, mildly distended and tender, dec bowel sounds   NG bilious/brown  extr- no c/c/e    Lab, Imaging and other studies:  CBC:   Lab Results   Component Value Date    WBC 7 12 10/14/2017    HGB 13 6 10/14/2017    HCT 41 6 10/14/2017    MCV 89 10/14/2017     10/14/2017    MCH 29 2 10/14/2017    MCHC 32 7 10/14/2017    RDW 13 1 10/14/2017    MPV 9 5 10/14/2017   , CMP:   Lab Results   Component Value Date     10/14/2017    K 3 6 10/14/2017     10/14/2017    CO2 29 10/14/2017    ANIONGAP 7 10/14/2017    BUN 25 10/14/2017    CREATININE 0 77 10/14/2017    GLUCOSE 102 10/14/2017 CALCIUM 8 5 10/14/2017    EGFR 101 10/14/2017     VTE Pharmacologic Prophylaxis: Enoxaparin (Lovenox)  VTE Mechanical Prophylaxis: sequential compression device

## 2017-10-14 NOTE — PLAN OF CARE
Problem: GASTROINTESTINAL - ADULT  Goal: Minimal or absence of nausea and/or vomiting  INTERVENTIONS:  - Administer IV fluids as ordered to ensure adequate hydration  - Maintain NPO status until nausea and vomiting are resolved  - Nasogastric tube as ordered  - Administer ordered antiemetic medications as needed  - Provide nonpharmacologic comfort measures as appropriate  - Advance diet as tolerated, if ordered  - Nutrition services referral to assist patient with adequate nutrition and appropriate food choices   Outcome: Progressing    Goal: Maintains or returns to baseline bowel function  INTERVENTIONS:  - Assess bowel function  - Encourage oral fluids to ensure adequate hydration  - Administer IV fluids as ordered to ensure adequate hydration  - Administer ordered medications as needed  - Encourage mobilization and activity  - Nutrition services referral to assist patient with appropriate food choices   Outcome: Progressing      Problem: GENITOURINARY - ADULT  Goal: Maintains or returns to baseline urinary function  INTERVENTIONS:  - Assess urinary function  - Encourage oral fluids to ensure adequate hydration  - Administer IV fluids as ordered to ensure adequate hydration  - Administer ordered medications as needed  - Offer frequent toileting  - Follow urinary retention protocol if ordered   Outcome: Progressing    Goal: Absence of urinary retention  INTERVENTIONS:  - Assess patients ability to void and empty bladder  - Monitor I/O  - Bladder scan as needed  - Discuss with physician/AP medications to alleviate retention as needed  - Discuss catheterization for long term situations as appropriate   Outcome: Progressing      Problem: METABOLIC, FLUID AND ELECTROLYTES - ADULT  Goal: Fluid balance maintained  INTERVENTIONS:  - Monitor labs and assess for signs and symptoms of volume excess or deficit  - Monitor I/O and WT  - Instruct patient on fluid and nutrition as appropriate   Outcome: Progressing

## 2017-10-14 NOTE — PLAN OF CARE
GASTROINTESTINAL - ADULT     Minimal or absence of nausea and/or vomiting Progressing     Maintains or returns to baseline bowel function Progressing        GENITOURINARY - ADULT     Maintains or returns to baseline urinary function Progressing     Absence of urinary retention Progressing        METABOLIC, FLUID AND ELECTROLYTES - ADULT     Fluid balance maintained Progressing

## 2017-10-15 ENCOUNTER — ANESTHESIA (INPATIENT)
Dept: PERIOP | Facility: HOSPITAL | Age: 57
DRG: 336 | End: 2017-10-15
Payer: COMMERCIAL

## 2017-10-15 ENCOUNTER — APPOINTMENT (INPATIENT)
Dept: RADIOLOGY | Facility: HOSPITAL | Age: 57
DRG: 336 | End: 2017-10-15
Payer: COMMERCIAL

## 2017-10-15 ENCOUNTER — ANESTHESIA EVENT (INPATIENT)
Dept: PERIOP | Facility: HOSPITAL | Age: 57
DRG: 336 | End: 2017-10-15
Payer: COMMERCIAL

## 2017-10-15 LAB
ANION GAP SERPL CALCULATED.3IONS-SCNC: 6 MMOL/L (ref 4–13)
BUN SERPL-MCNC: 13 MG/DL (ref 5–25)
CALCIUM SERPL-MCNC: 8.1 MG/DL (ref 8.3–10.1)
CHLORIDE SERPL-SCNC: 102 MMOL/L (ref 100–108)
CO2 SERPL-SCNC: 28 MMOL/L (ref 21–32)
CREAT SERPL-MCNC: 0.71 MG/DL (ref 0.6–1.3)
ERYTHROCYTE [DISTWIDTH] IN BLOOD BY AUTOMATED COUNT: 13 % (ref 11.6–15.1)
GFR SERPL CREATININE-BSD FRML MDRD: 105 ML/MIN/1.73SQ M
GLUCOSE SERPL-MCNC: 120 MG/DL (ref 65–140)
HCT VFR BLD AUTO: 40.1 % (ref 36.5–49.3)
HGB BLD-MCNC: 13 G/DL (ref 12–17)
MAGNESIUM SERPL-MCNC: 1.9 MG/DL (ref 1.6–2.6)
MCH RBC QN AUTO: 29 PG (ref 26.8–34.3)
MCHC RBC AUTO-ENTMCNC: 32.4 G/DL (ref 31.4–37.4)
MCV RBC AUTO: 89 FL (ref 82–98)
PLATELET # BLD AUTO: 205 THOUSANDS/UL (ref 149–390)
PMV BLD AUTO: 9.3 FL (ref 8.9–12.7)
POTASSIUM SERPL-SCNC: 3.7 MMOL/L (ref 3.5–5.3)
RBC # BLD AUTO: 4.49 MILLION/UL (ref 3.88–5.62)
SODIUM SERPL-SCNC: 136 MMOL/L (ref 136–145)
WBC # BLD AUTO: 6.76 THOUSAND/UL (ref 4.31–10.16)

## 2017-10-15 PROCEDURE — 0DN80ZZ RELEASE SMALL INTESTINE, OPEN APPROACH: ICD-10-PCS | Performed by: SURGERY

## 2017-10-15 PROCEDURE — 94760 N-INVAS EAR/PLS OXIMETRY 1: CPT

## 2017-10-15 PROCEDURE — C9113 INJ PANTOPRAZOLE SODIUM, VIA: HCPCS | Performed by: SURGERY

## 2017-10-15 PROCEDURE — 3E0M05Z INTRODUCTION OF ADHESION BARRIER INTO PERITONEAL CAVITY, OPEN APPROACH: ICD-10-PCS | Performed by: SURGERY

## 2017-10-15 PROCEDURE — 83735 ASSAY OF MAGNESIUM: CPT | Performed by: SURGERY

## 2017-10-15 PROCEDURE — 85027 COMPLETE CBC AUTOMATED: CPT | Performed by: SURGERY

## 2017-10-15 PROCEDURE — 74022 RADEX COMPL AQT ABD SERIES: CPT

## 2017-10-15 PROCEDURE — C1765 ADHESION BARRIER: HCPCS | Performed by: SURGERY

## 2017-10-15 PROCEDURE — 0WQF0ZZ REPAIR ABDOMINAL WALL, OPEN APPROACH: ICD-10-PCS | Performed by: SURGERY

## 2017-10-15 PROCEDURE — 80048 BASIC METABOLIC PNL TOTAL CA: CPT | Performed by: SURGERY

## 2017-10-15 PROCEDURE — 94762 N-INVAS EAR/PLS OXIMTRY CONT: CPT

## 2017-10-15 RX ORDER — ALBUMIN, HUMAN INJ 5% 5 %
SOLUTION INTRAVENOUS CONTINUOUS PRN
Status: DISCONTINUED | OUTPATIENT
Start: 2017-10-15 | End: 2017-10-15 | Stop reason: SURG

## 2017-10-15 RX ORDER — SODIUM CHLORIDE, SODIUM LACTATE, POTASSIUM CHLORIDE, CALCIUM CHLORIDE 600; 310; 30; 20 MG/100ML; MG/100ML; MG/100ML; MG/100ML
INJECTION, SOLUTION INTRAVENOUS CONTINUOUS PRN
Status: DISCONTINUED | OUTPATIENT
Start: 2017-10-15 | End: 2017-10-15 | Stop reason: SURG

## 2017-10-15 RX ORDER — MAGNESIUM HYDROXIDE 1200 MG/15ML
LIQUID ORAL AS NEEDED
Status: DISCONTINUED | OUTPATIENT
Start: 2017-10-15 | End: 2017-10-15 | Stop reason: HOSPADM

## 2017-10-15 RX ORDER — EPHEDRINE SULFATE 50 MG/ML
INJECTION, SOLUTION INTRAVENOUS AS NEEDED
Status: DISCONTINUED | OUTPATIENT
Start: 2017-10-15 | End: 2017-10-15 | Stop reason: SURG

## 2017-10-15 RX ORDER — PROPOFOL 10 MG/ML
INJECTION, EMULSION INTRAVENOUS AS NEEDED
Status: DISCONTINUED | OUTPATIENT
Start: 2017-10-15 | End: 2017-10-15 | Stop reason: SURG

## 2017-10-15 RX ORDER — SUCCINYLCHOLINE CHLORIDE 20 MG/ML
INJECTION INTRAMUSCULAR; INTRAVENOUS AS NEEDED
Status: DISCONTINUED | OUTPATIENT
Start: 2017-10-15 | End: 2017-10-15 | Stop reason: SURG

## 2017-10-15 RX ORDER — HEPARIN SODIUM 5000 [USP'U]/ML
5000 INJECTION, SOLUTION INTRAVENOUS; SUBCUTANEOUS EVERY 12 HOURS SCHEDULED
Status: DISCONTINUED | OUTPATIENT
Start: 2017-10-15 | End: 2017-10-18

## 2017-10-15 RX ORDER — NALBUPHINE HCL 10 MG/ML
5 AMPUL (ML) INJECTION
Status: DISCONTINUED | OUTPATIENT
Start: 2017-10-15 | End: 2017-10-22 | Stop reason: HOSPADM

## 2017-10-15 RX ORDER — ROCURONIUM BROMIDE 10 MG/ML
INJECTION, SOLUTION INTRAVENOUS AS NEEDED
Status: DISCONTINUED | OUTPATIENT
Start: 2017-10-15 | End: 2017-10-15 | Stop reason: SURG

## 2017-10-15 RX ORDER — GLYCOPYRROLATE 0.2 MG/ML
INJECTION INTRAMUSCULAR; INTRAVENOUS AS NEEDED
Status: DISCONTINUED | OUTPATIENT
Start: 2017-10-15 | End: 2017-10-15 | Stop reason: SURG

## 2017-10-15 RX ORDER — BUPIVACAINE HYDROCHLORIDE 2.5 MG/ML
INJECTION, SOLUTION INFILTRATION; PERINEURAL AS NEEDED
Status: DISCONTINUED | OUTPATIENT
Start: 2017-10-15 | End: 2017-10-15 | Stop reason: SURG

## 2017-10-15 RX ORDER — CEFAZOLIN SODIUM 1 G/3ML
INJECTION, POWDER, FOR SOLUTION INTRAMUSCULAR; INTRAVENOUS AS NEEDED
Status: DISCONTINUED | OUTPATIENT
Start: 2017-10-15 | End: 2017-10-15 | Stop reason: SURG

## 2017-10-15 RX ORDER — FENTANYL CITRATE 50 UG/ML
INJECTION, SOLUTION INTRAMUSCULAR; INTRAVENOUS AS NEEDED
Status: DISCONTINUED | OUTPATIENT
Start: 2017-10-15 | End: 2017-10-15 | Stop reason: SURG

## 2017-10-15 RX ORDER — FENTANYL CITRATE/PF 50 MCG/ML
50 SYRINGE (ML) INJECTION
Status: DISCONTINUED | OUTPATIENT
Start: 2017-10-15 | End: 2017-10-15 | Stop reason: HOSPADM

## 2017-10-15 RX ADMIN — CEFAZOLIN SODIUM 2000 MG: 1 INJECTION, POWDER, FOR SOLUTION INTRAMUSCULAR; INTRAVENOUS at 12:20

## 2017-10-15 RX ADMIN — BUPIVACAINE HYDROCHLORIDE 5 ML: 2.5 INJECTION, SOLUTION INFILTRATION; PERINEURAL at 12:30

## 2017-10-15 RX ADMIN — FENTANYL CITRATE 25 MCG: 50 INJECTION INTRAMUSCULAR; INTRAVENOUS at 12:29

## 2017-10-15 RX ADMIN — PANTOPRAZOLE SODIUM 40 MG: 40 INJECTION, POWDER, FOR SOLUTION INTRAVENOUS at 08:34

## 2017-10-15 RX ADMIN — DEXTROSE, SODIUM CHLORIDE, AND POTASSIUM CHLORIDE 125 ML/HR: 5; .45; .15 INJECTION INTRAVENOUS at 03:58

## 2017-10-15 RX ADMIN — ALBUMIN HUMAN: 0.05 INJECTION, SOLUTION INTRAVENOUS at 13:16

## 2017-10-15 RX ADMIN — Medication 1 SPRAY: at 17:36

## 2017-10-15 RX ADMIN — METRONIDAZOLE 500 MG: 500 SOLUTION INTRAVENOUS at 12:30

## 2017-10-15 RX ADMIN — SODIUM CHLORIDE, SODIUM LACTATE, POTASSIUM CHLORIDE, AND CALCIUM CHLORIDE: .6; .31; .03; .02 INJECTION, SOLUTION INTRAVENOUS at 13:00

## 2017-10-15 RX ADMIN — HEPARIN SODIUM 5000 UNITS: 5000 INJECTION, SOLUTION INTRAVENOUS; SUBCUTANEOUS at 21:45

## 2017-10-15 RX ADMIN — FENTANYL CITRATE 25 MCG: 50 INJECTION INTRAMUSCULAR; INTRAVENOUS at 13:02

## 2017-10-15 RX ADMIN — ROPIVACAINE HYDROCHLORIDE: 5 INJECTION, SOLUTION EPIDURAL; INFILTRATION; PERINEURAL at 15:01

## 2017-10-15 RX ADMIN — DEXTROSE, SODIUM CHLORIDE, AND POTASSIUM CHLORIDE 125 ML/HR: 5; .45; .15 INJECTION INTRAVENOUS at 16:17

## 2017-10-15 RX ADMIN — Medication 1 SPRAY: at 23:32

## 2017-10-15 RX ADMIN — EPHEDRINE SULFATE 10 MG: 50 INJECTION, SOLUTION INTRAMUSCULAR; INTRAVENOUS; SUBCUTANEOUS at 13:25

## 2017-10-15 RX ADMIN — BUPIVACAINE HYDROCHLORIDE 5 ML: 2.5 INJECTION, SOLUTION INFILTRATION; PERINEURAL at 12:23

## 2017-10-15 RX ADMIN — ROCURONIUM BROMIDE 40 MG: 10 INJECTION, SOLUTION INTRAVENOUS at 12:33

## 2017-10-15 RX ADMIN — LIDOCAINE HYDROCHLORIDE 60 MG: 20 INJECTION, SOLUTION INTRAVENOUS at 12:16

## 2017-10-15 RX ADMIN — EPHEDRINE SULFATE 15 MG: 50 INJECTION, SOLUTION INTRAMUSCULAR; INTRAVENOUS; SUBCUTANEOUS at 13:41

## 2017-10-15 RX ADMIN — NEOSTIGMINE METHYLSULFATE 3 MG: 1 INJECTION, SOLUTION INTRAMUSCULAR; INTRAVENOUS; SUBCUTANEOUS at 13:52

## 2017-10-15 RX ADMIN — FENTANYL CITRATE 25 MCG: 50 INJECTION INTRAMUSCULAR; INTRAVENOUS at 12:22

## 2017-10-15 RX ADMIN — PROPOFOL 200 MG: 10 INJECTION, EMULSION INTRAVENOUS at 12:16

## 2017-10-15 RX ADMIN — ROCURONIUM BROMIDE 10 MG: 10 INJECTION, SOLUTION INTRAVENOUS at 13:02

## 2017-10-15 RX ADMIN — BUPIVACAINE HYDROCHLORIDE 5 ML: 2.5 INJECTION, SOLUTION INFILTRATION; PERINEURAL at 12:38

## 2017-10-15 RX ADMIN — SUCCINYLCHOLINE CHLORIDE 100 MG: 20 INJECTION, SOLUTION INTRAMUSCULAR; INTRAVENOUS at 12:16

## 2017-10-15 RX ADMIN — GLYCOPYRROLATE 0.4 MG: 0.2 INJECTION, SOLUTION INTRAMUSCULAR; INTRAVENOUS at 13:52

## 2017-10-15 RX ADMIN — SODIUM CHLORIDE, SODIUM LACTATE, POTASSIUM CHLORIDE, AND CALCIUM CHLORIDE: .6; .31; .03; .02 INJECTION, SOLUTION INTRAVENOUS at 12:02

## 2017-10-15 RX ADMIN — FENTANYL CITRATE 25 MCG: 50 INJECTION INTRAMUSCULAR; INTRAVENOUS at 12:36

## 2017-10-15 NOTE — OP NOTE
OPERATIVE REPORT  PATIENT NAME: Tisha Haq    :  1960  MRN: [id number]  Pt Location: AN OR ROOM 01    SURGERY DATE: 10/15/2017    Surgeon(s) and Role:     * Laurie Carcamo MD - Primary     * Lori Walter MD - Assisting    Preop Diagnosis:  Small bowel obstruction [K56 609]    Post-Op Diagnosis Codes:     * Small bowel obstruction [S95 455]  umbilical hernia    Procedure(s) (LRB):  LAPAROTOMY EXPLORATORY, lysis of adhesions, repair of umbilical hernia without mesh (N/A)    Specimen(s):  * No specimens in log *    Estimated Blood Loss:   Minimal    Drains:  [REMOVED] NG/OG/Enteral Tube Nasogastric 16 Fr Right nares (Removed)   Removed 10/15/17 1337   Placement Reverification Auscultation 10/15/2017  8:30 AM   Site Assessment Clean;Dry; Intact 10/15/2017  8:30 AM   Status Irrigated 10/15/2017  8:30 AM   Drainage Appearance Mearl Locket; Thick 10/15/2017  8:30 AM   Intake (mL) 20 mL 10/15/2017  8:30 AM   Output (mL) 200 mL 10/15/2017  6:01 AM   Number of days: 3       Anesthesia Type:   General    Operative Indications:  Small bowel obstruction [K56 609]      Operative Findings:  Independent, nonsmoker, ASA 2 emergency, wound class 1  Cardiovascular  Hypertension ,  Pulmonary   GI/Hepatic  Intestinal obstruction,     Endo/Other GYN   Hematology Musculoskeletal   Neurology Psychology          Complications:   None    Procedure and Technique:  Patient was identified visually and via armband  Placed in supine position  After general anesthesia the abdomen was prepped and draped in a sterile fashion  0 25% Marcaine with epinephrine was utilized throughout the procedure  An incision was made in the midline abdomen  This carried down to skin and subcutaneous tissue  Fascia was entered  Under direct vision the abdomen was entered  Multiple intra-loop adhesions were noted with a transition point in the lower abdomen  Proximal dilated loops of bowel or prominent      Extensive lysis of adhesions was then completed over the lower intestinal segments  A serosal tear was created in lysing an adhesion  This is expected that the area of obstruction as the Tesio perform interval   An enterotomy was not created  This was repaired with 3 0 PDS suture  The small bowel was then run to the ligament of Treitz and ileum  Additional adhesions were divided  Seprafilm was then placed in the ileal segment where wall surface area was noted over the ileal mesentery  This was then also placed between the small bowel in the omentum  The omentum was pexy to the lower abdominal wall  At this point the umbilical hernia was repaired with PDS suture  The fascia was then closed with a running 1  PDS suture  This then followed by clips for skin  The patient was awakened from anesthesia and transferred to the recovery room stable condition    Sponge and instrument count correct   I was present for the entire procedure    Patient Disposition:  PACU     SIGNATURE: Maite Kimble MD  DATE: October 15, 2017  TIME: 2:00 PM

## 2017-10-15 NOTE — PROGRESS NOTES
Progress Note - General Surgery   Nonda Duran 65 y o  male MRN: [id number]  Unit/Bed#: OR POOL Encounter: [id number]    Assessment:  64 yo M with SBO likely 2/2 adhesions    Plan:  SBO- OR today for ex lap, MORIAH, possible bowel resection    HTN- hold home meds, con't to monitor    PPX- lovenox, will add protonix as well  Subjective/Objective   Chief Complaint: abd pain    Subjective: still with abd soreness  No bowel function  con't NG drainage    Objective:     Blood pressure 140/90, pulse 75, temperature 98 4 °F (36 9 °C), temperature source Temporal, resp  rate 18, height 6' 1" (1 854 m), weight 93 kg (205 lb), SpO2 97 %  ,There is no height or weight on file to calculate BMI  Intake/Output Summary (Last 24 hours) at 10/15/17 1228  Last data filed at 10/15/17 1024   Gross per 24 hour   Intake          1189 58 ml   Output             3825 ml   Net         -2635 42 ml       Invasive Devices     Peripheral Intravenous Line            Peripheral IV 10/12/17 Left Antecubital 3 days          Drain            NG/OG/Enteral Tube Nasogastric 16 Fr Right nares 3 days                Physical Exam:   gen- in bed, tired appearing  Heart- tachy to low 100s  Lungs- clear  abd- soft, mildly distended and tender, dec bowel sounds   NG bilious/brown  extr- no c/c/e    Lab, Imaging and other studies:  CBC:   Lab Results   Component Value Date    WBC 6 76 10/15/2017    HGB 13 0 10/15/2017    HCT 40 1 10/15/2017    MCV 89 10/15/2017     10/15/2017    MCH 29 0 10/15/2017    MCHC 32 4 10/15/2017    RDW 13 0 10/15/2017    MPV 9 3 10/15/2017   , CMP:   Lab Results   Component Value Date     10/15/2017    K 3 7 10/15/2017     10/15/2017    CO2 28 10/15/2017    ANIONGAP 6 10/15/2017    BUN 13 10/15/2017    CREATININE 0 71 10/15/2017    GLUCOSE 120 10/15/2017    CALCIUM 8 1 (L) 10/15/2017    EGFR 105 10/15/2017     VTE Pharmacologic Prophylaxis: Enoxaparin (Lovenox)  VTE Mechanical Prophylaxis: sequential compression device

## 2017-10-15 NOTE — PLAN OF CARE
Problem: PAIN - ADULT  Goal: Verbalizes/displays adequate comfort level or baseline comfort level  Interventions:  - Encourage patient to monitor pain and request assistance  - Assess pain using appropriate pain scale  - Administer analgesics based on type and severity of pain and evaluate response  - Implement non-pharmacological measures as appropriate and evaluate response  - Consider cultural and social influences on pain and pain management  - Notify physician/advanced practitioner if interventions unsuccessful or patient reports new pain  Outcome: Progressing      Problem: Knowledge Deficit  Goal: Patient/family/caregiver demonstrates understanding of disease process, treatment plan, medications, and discharge instructions  Complete learning assessment and assess knowledge base    Interventions:  - Provide teaching at level of understanding  - Provide teaching via preferred learning methods  Outcome: Progressing

## 2017-10-15 NOTE — ANESTHESIA PROCEDURE NOTES
Epidural Block    Patient location during procedure: pre-op  Start time: 10/15/2017 11:58 AM  Reason for block: at surgeon's request and post-op pain management  Staffing  Anesthesiologist: Jae Schulte  Performed: anesthesiologist   Preanesthetic Checklist  Completed: patient identified, site marked, surgical consent, pre-op evaluation, timeout performed, IV checked, risks and benefits discussed and monitors and equipment checked  Epidural  Patient position: sitting  Prep: Betadine  Patient monitoring: heart rate, cardiac monitor, continuous pulse ox and frequent blood pressure checks  Approach: midline  Location: thoracic (1-12)  Injection technique: CHAIM saline  Needle  Needle type: Tuohy   Needle gauge: 18 G  Catheter type: end hole  Catheter size: 20 G  Catheter at skin depth: 12 cm  Test dose: negative and lidocaine 1 5% with epinephrine 1-to-200,000  Assessment  Sensory level: [id number] aspiration for CSF, negative aspiration for heme and no paresthesia on injection  patient tolerated the procedure well with no immediate complications

## 2017-10-15 NOTE — ANESTHESIA PREPROCEDURE EVALUATION
Review of Systems/Medical History          Cardiovascular  Hypertension ,    Pulmonary       GI/Hepatic    Intestinal obstruction,             Endo/Other     GYN       Hematology   Musculoskeletal       Neurology   Psychology           Physical Exam    Airway    Mallampati score: II         Dental   No notable dental hx     Cardiovascular      Pulmonary      Other Findings        Anesthesia Plan  ASA Score- 2 Emergent      Anesthesia Type- general with ASA Monitors  Additional Monitors:   Airway Plan:     Comment: I, Dr Mandy Neil, the attending physician, have personally seen and evaluated the patient prior to anesthetic care  I have reviewed the pre-anesthetic record, and other medical records if appropriate to the anesthetic care  If a CRNA is involved in the case, I have reviewed the CRNA assessment, if present, and agree  The patient is in a suitable condition to proceed with my formulated anesthetic plan          Induction- intravenous  Informed Consent- Anesthetic plan and risks discussed with patient  I personally reviewed this patient with the CRNA  Discussed and agreed on the Anesthesia Plan with the CRNA  Leah Tariq

## 2017-10-15 NOTE — ANESTHESIA POSTPROCEDURE EVALUATION
Post-Op Assessment Note      CV Status:  Stable    Mental Status:  Alert    Hydration Status:  Stable    PONV Controlled:  None    Airway Patency:  Patent    Post Op Vitals Reviewed: Yes          Staff: Anesthesiologist           BP      Temp      Pulse     Resp      SpO2

## 2017-10-16 LAB
ANION GAP SERPL CALCULATED.3IONS-SCNC: 5 MMOL/L (ref 4–13)
BUN SERPL-MCNC: 12 MG/DL (ref 5–25)
CALCIUM SERPL-MCNC: 8.1 MG/DL (ref 8.3–10.1)
CHLORIDE SERPL-SCNC: 100 MMOL/L (ref 100–108)
CO2 SERPL-SCNC: 26 MMOL/L (ref 21–32)
CREAT SERPL-MCNC: 0.7 MG/DL (ref 0.6–1.3)
ERYTHROCYTE [DISTWIDTH] IN BLOOD BY AUTOMATED COUNT: 13 % (ref 11.6–15.1)
GFR SERPL CREATININE-BSD FRML MDRD: 105 ML/MIN/1.73SQ M
GLUCOSE SERPL-MCNC: 132 MG/DL (ref 65–140)
HCT VFR BLD AUTO: 40.8 % (ref 36.5–49.3)
HGB BLD-MCNC: 13.2 G/DL (ref 12–17)
MAGNESIUM SERPL-MCNC: 1.7 MG/DL (ref 1.6–2.6)
MCH RBC QN AUTO: 28.8 PG (ref 26.8–34.3)
MCHC RBC AUTO-ENTMCNC: 32.4 G/DL (ref 31.4–37.4)
MCV RBC AUTO: 89 FL (ref 82–98)
PLATELET # BLD AUTO: 212 THOUSANDS/UL (ref 149–390)
PMV BLD AUTO: 9.5 FL (ref 8.9–12.7)
POTASSIUM SERPL-SCNC: 3.9 MMOL/L (ref 3.5–5.3)
RBC # BLD AUTO: 4.58 MILLION/UL (ref 3.88–5.62)
SODIUM SERPL-SCNC: 131 MMOL/L (ref 136–145)
WBC # BLD AUTO: 6.78 THOUSAND/UL (ref 4.31–10.16)

## 2017-10-16 PROCEDURE — 85027 COMPLETE CBC AUTOMATED: CPT | Performed by: SURGERY

## 2017-10-16 PROCEDURE — 80048 BASIC METABOLIC PNL TOTAL CA: CPT | Performed by: SURGERY

## 2017-10-16 PROCEDURE — 94762 N-INVAS EAR/PLS OXIMTRY CONT: CPT

## 2017-10-16 PROCEDURE — 94760 N-INVAS EAR/PLS OXIMETRY 1: CPT

## 2017-10-16 PROCEDURE — 83735 ASSAY OF MAGNESIUM: CPT | Performed by: SURGERY

## 2017-10-16 PROCEDURE — C9113 INJ PANTOPRAZOLE SODIUM, VIA: HCPCS | Performed by: SURGERY

## 2017-10-16 RX ADMIN — Medication 1 SPRAY: at 07:07

## 2017-10-16 RX ADMIN — DEXTROSE, SODIUM CHLORIDE, AND POTASSIUM CHLORIDE 125 ML/HR: 5; .45; .15 INJECTION INTRAVENOUS at 10:37

## 2017-10-16 RX ADMIN — PANTOPRAZOLE SODIUM 40 MG: 40 INJECTION, POWDER, FOR SOLUTION INTRAVENOUS at 08:58

## 2017-10-16 RX ADMIN — DEXTROSE, SODIUM CHLORIDE, AND POTASSIUM CHLORIDE 125 ML/HR: 5; .45; .15 INJECTION INTRAVENOUS at 19:03

## 2017-10-16 RX ADMIN — HEPARIN SODIUM 5000 UNITS: 5000 INJECTION, SOLUTION INTRAVENOUS; SUBCUTANEOUS at 21:07

## 2017-10-16 RX ADMIN — DEXTROSE, SODIUM CHLORIDE, AND POTASSIUM CHLORIDE 125 ML/HR: 5; .45; .15 INJECTION INTRAVENOUS at 01:25

## 2017-10-16 RX ADMIN — ROPIVACAINE HYDROCHLORIDE: 5 INJECTION, SOLUTION EPIDURAL; INFILTRATION; PERINEURAL at 10:35

## 2017-10-16 RX ADMIN — DIPHENHYDRAMINE HYDROCHLORIDE 25 MG: 50 INJECTION, SOLUTION INTRAMUSCULAR; INTRAVENOUS at 12:18

## 2017-10-16 RX ADMIN — DIPHENHYDRAMINE HYDROCHLORIDE 25 MG: 50 INJECTION, SOLUTION INTRAMUSCULAR; INTRAVENOUS at 21:07

## 2017-10-16 RX ADMIN — HEPARIN SODIUM 5000 UNITS: 5000 INJECTION, SOLUTION INTRAVENOUS; SUBCUTANEOUS at 08:58

## 2017-10-16 RX ADMIN — MENTHOL 5.4 MG: 5.4 LOZENGE ORAL at 08:56

## 2017-10-16 NOTE — PROGRESS NOTES
Progress Note -Surgery PA  Shahida Parr 65 y o  male MRN: [id number]  Unit/Bed#: -01 Encounter: [id number]      Subjective/Objective     Subjective: Pt states he feels better  Main complaint is sore throat  Denies flatus  No abdominal pain, currently has epidural  NGT output 550 last 24 hours  Currently has barajas       Objective:     /78   Pulse 82   Temp 98 3 °F (36 8 °C) (Oral)   Resp 16   Ht 6' 1" (1 854 m)   Wt 93 kg (205 lb)   SpO2 96%       Intake/Output Summary (Last 24 hours) at 10/16/17 2805  Last data filed at 10/16/17 0700   Gross per 24 hour   Intake           3146 5 ml   Output             3330 ml   Net           -183 5 ml       Invasive Devices     Peripheral Intravenous Line            Peripheral IV 10/15/17 Right Hand less than 1 day          Epidural Line            Epidural Catheter 10/15/17 less than 1 day          Drain            NG/OG/Enteral Tube Nasogastric 18 Fr Right nares less than 1 day    Urethral Catheter Latex 16 Fr  less than 1 day                Physical Exam:  General appearance: alert, appears stated age and cooperative  Lungs: clear to auscultation bilaterally  Heart: regular rate and rhythm, S1, S2 normal, no murmur, click, rub or gallop  Abdomen: soft, non-tender; bowel sounds normal; no masses,  no organomegaly, incision dressing clean/dry  Extremities: extremities normal, atraumatic, no cyanosis or edema      Current Facility-Administered Medications:     dextrose 5 % and sodium chloride 0 45 % with KCl 20 mEq/L infusion, 125 mL/hr, Intravenous, Continuous, Ozzy Mistry MD, Last Rate: 125 mL/hr at 10/16/17 0125, 125 mL/hr at 10/16/17 0125    diphenhydrAMINE (BENADRYL) injection 25 mg, 25 mg, Intravenous, Q6H PRN, Silvia Carpio PA-C, 25 mg at 10/13/17 0458    heparin (porcine) subcutaneous injection 5,000 Units, 5,000 Units, Subcutaneous, Q12H [address], Ozzy Mistry MD, 5,000 Units at 10/15/17 2145    HYDROmorphone (DILAUDID) 1 mg/mL injection 0 5 mg, 0 5 mg, Intravenous, Q2H PRN, Fiordaliza Murillo MD    HYDROmorphone (DILAUDID) 1 mg/mL injection 0 5 mg, 0 5 mg, Intravenous, Q1H PRN, Luciana Dong MD    HYDROmorphone (DILAUDID) 1 mg/mL injection 1 mg, 1 mg, Intravenous, Q3H PRN, Fiordaliza Murillo MD    metoclopramide (REGLAN) injection 10 mg, 10 mg, Intravenous, Q6H PRN, Chloe Ocampo PA-C, 10 mg at 10/13/17 1953    metoprolol (LOPRESSOR) injection 2 5 mg, 2 5 mg, Intravenous, Q6H PRN, Jaden Thapa PA-C    nalbuphine (NUBAIN) injection 5 mg, 5 mg, Intravenous, Q3H PRN, Luciana Dong MD    ondansetron [facility]) injection 4 mg, 4 mg, Intravenous, Q4H PRN, Fiordaliza Murillo MD, 4 mg at 10/13/17 1610    pantoprazole (PROTONIX) injection 40 mg, 40 mg, Intravenous, Q24H [address], Fiordaliza uMrillo MD, 40 mg at 10/15/17 0834    phenol (CHLORASEPTIC) 1 4 % mucosal liquid 1 spray, 1 spray, Mouth/Throat, Q2H PRN, Robby Skinner PA-C, 1 spray at 10/16/17 0707    ropivacaine 0 1% and fentaNYL 2 mcg/mL 250 mL PCEA, , Epidural, Continuous, Luciana Dong MD              Lab, Imaging and other studies:  I have personally reviewed pertinent lab results  , CBC:   Lab Results   Component Value Date    WBC 6 78 10/16/2017    HGB 13 2 10/16/2017    HCT 40 8 10/16/2017    MCV 89 10/16/2017     10/16/2017    MCH 28 8 10/16/2017    MCHC 32 4 10/16/2017    RDW 13 0 10/16/2017    MPV 9 5 10/16/2017   , CMP:   Lab Results   Component Value Date     (L) 10/16/2017    K 3 9 10/16/2017     10/16/2017    CO2 26 10/16/2017    ANIONGAP 5 10/16/2017    BUN 12 10/16/2017    CREATININE 0 70 10/16/2017    GLUCOSE 132 10/16/2017    CALCIUM 8 1 (L) 10/16/2017    EGFR 105 10/16/2017     Labs in chart were reviewed    Lab Results   Component Value Date    WBC 6 78 10/16/2017    HGB 13 2 10/16/2017    HCT 40 8 10/16/2017     10/16/2017     Lab Results   Component Value Date     (L) 10/16/2017    K 3 9 10/16/2017     10/16/2017    CO2 26 10/16/2017    BUN 12 10/16/2017    CREATININE 0 70 10/16/2017    GLUCOSE 132 10/16/2017       VTE Pharmacologic Prophylaxis: Heparin  VTE Mechanical Prophylaxis: sequential compression device    Assessment:    65year old male with SBO, POD #1, s/p lysis of adhesions       Plan:  - Continue NGT/IVF  - Continue epidural until bowel fx returns  - Possibly d/c barajas tomorrow  - DVT ppx  - lozenges for throat    Nahomy Chcako PA-C

## 2017-10-16 NOTE — MEDICAL STUDENT
MEDICAL STUDENT  Inpatient Progress Note for TRAINING ONLY  Not Part of Legal Medical Record       Progress Note - Jaspal Handler 65 y o  male MRN: [id number]    Unit/Bed#: -[facility] Encounter: [id number]      Assessment:  POD #1 status post ex lap with lysis of adhesions and umbilical hernia repair  No pain  No flatus or Bm  NGT output: 200 cc overnight  Plan:  Continue pain control (epidural)  Keep barajas catheter   NPO/NGT  IVF  OOB and ambulate  SI      Subjective:   Patient is feeling much better today  (-) flatus/BM/N/V  (-) abdominal pain  Mild discomfort from NGT      Objective:     Vitals: Blood pressure 123/78, pulse 82, temperature 98 3 °F (36 8 °C), temperature source Oral, resp  rate 16, height 6' 1" (1 854 m), weight 93 kg (205 lb), SpO2 96 %  ,There is no height or weight on file to calculate BMI  /78   Pulse 82   Temp 98 3 °F (36 8 °C) (Oral)   Resp 16   Ht 6' 1" (1 854 m)   Wt 93 kg (205 lb)   SpO2 96%     General Appearance:  Alert, cooperative, no distress, appears stated age   Abdomen:   Soft, non-tender  Some bowel sounds present           Intake/Output Summary (Last 24 hours) at 10/16/17 0757  Last data filed at 10/16/17 0700   Gross per 24 hour   Intake           3166 5 ml   Output             3930 ml   Net           -763 5 ml     NGT output: 200 cc overnight    Invasive Devices     Peripheral Intravenous Line            Peripheral IV 10/15/17 Right Hand less than 1 day          Epidural Line            Epidural Catheter 10/15/17 less than 1 day          Drain            NG/OG/Enteral Tube Nasogastric 18 Fr Right nares less than 1 day    Urethral Catheter Latex 16 Fr  less than 1 day              Medications:  diphenhydrAMINE (BENADRYL) injection 25 mg  Dose: 25 mg Freq: Every 6 hours PRN Route: IV  PRN Reasons: itching,sleep  Start: 10/12/17 2031   HYDROmorphone (DILAUDID) 1 mg/mL injection 0 5 mg  Dose: 0 5 mg Freq: Every 1 hour PRN Route: IV  PRN Reason: breakthrough pain  Start: 10/15/17 1336   HYDROmorphone (DILAUDID) 1 mg/mL injection 0 5 mg  Dose: 0 5 mg Freq: Every 2 hour PRN Route: IV  PRN Reason: severe pain  Start: 10/12/17 0839   HYDROmorphone (DILAUDID) 1 mg/mL injection 1 mg  Dose: 1 mg Freq: Every 3 hours PRN Route: IV  PRN Reasons: severe pain,breakthrough pain  Start: 10/12/17 0840   metoclopramide (REGLAN) injection 10 mg  Dose: 10 mg Freq: Every 6 hours PRN Route: IV  PRN Reason: nausea  Start: 10/13/17 0748   metoprolol (LOPRESSOR) injection 2 5 mg  Dose: 2 5 mg Freq: Every 6 hours PRN Route: IV  PRN Reason: high blood pressure  PRN Comment: SBP >140, hold for HR <50  Indications Comment: HTN  Start: 10/12/17 0909   nalbuphine (NUBAIN) injection 5 mg  Dose: 5 mg Freq: Every 3 hours PRN Route: IV  PRN Reason: pruritis  Start: 10/15/17 1335   ondansetron (ZOFRAN) injection 4 mg  Dose: 4 mg Freq: Every 4 hours PRN Route: IV  PRN Reasons: nausea,vomiting  Start: 10/12/17 0839   phenol (CHLORASEPTIC) 1 4 % mucosal liquid 1 spray  Dose: 1 spray Freq: Every 2 hour PRN Route: MT  PRN Reason: sore throat  Start: 10/12/17 2031     dextrose 5 % and sodium chloride 0 45 % with KCl 20 mEq/L infusion  Rate: 125 mL/hr Freq: Continuous Route: IV  Last Dose: 125 mL/hr (10/16/17 0125)  Start: 10/14/17 1100   ropivacaine 0 1% and fentaNYL 2 mcg/mL 250 mL PCEA  Continuous Rate: 12 mL/hr  PCEA Dose: 5 mL  PCEA Lock-out: 10 Minutes  Max Doses per Hour: 3 doses/hr  Freq: Continuous Route: EP  Start: 10/15/17 1345     heparin (porcine) subcutaneous injection 5,000 Units  Dose: 5,000 Units Freq: Every 12 hours scheduled Route: SC  Start: 10/15/17 1415   pantoprazole (PROTONIX) injection 40 mg  Dose: 40 mg Freq: Every 24 hours scheduled Route: IV  Start: 10/14/17 1100         Lab, Imaging and other studies: I have personally reviewed pertinent reports      VTE Pharmacologic Prophylaxis: Heparin

## 2017-10-16 NOTE — ANESTHESIA POST-OP FOLLOW-UP NOTE
Patient: Joann Plump  Procedure(s):  LAPAROTOMY EXPLORATORY, lysis of adhesions, repair of umbilical hernia without mesh  Vitals:    10/16/17 1458   BP: 125/77   Pulse: 73   Resp: 16   Temp: 98 9 °F (37 2 °C)   SpO2: 95%     Patient is doing well post-op day 1  Pain 0/10  The epidural catheter in good position no bleeding and leaking  Plan: Keep epidural in will F/U tomorrow

## 2017-10-17 LAB
ANION GAP SERPL CALCULATED.3IONS-SCNC: 6 MMOL/L (ref 4–13)
BUN SERPL-MCNC: 7 MG/DL (ref 5–25)
CALCIUM SERPL-MCNC: 8.1 MG/DL (ref 8.3–10.1)
CHLORIDE SERPL-SCNC: 102 MMOL/L (ref 100–108)
CO2 SERPL-SCNC: 28 MMOL/L (ref 21–32)
CREAT SERPL-MCNC: 0.59 MG/DL (ref 0.6–1.3)
ERYTHROCYTE [DISTWIDTH] IN BLOOD BY AUTOMATED COUNT: 13.1 % (ref 11.6–15.1)
GFR SERPL CREATININE-BSD FRML MDRD: 113 ML/MIN/1.73SQ M
GLUCOSE SERPL-MCNC: 111 MG/DL (ref 65–140)
HCT VFR BLD AUTO: 39.9 % (ref 36.5–49.3)
HGB BLD-MCNC: 12.9 G/DL (ref 12–17)
MCH RBC QN AUTO: 28.8 PG (ref 26.8–34.3)
MCHC RBC AUTO-ENTMCNC: 32.3 G/DL (ref 31.4–37.4)
MCV RBC AUTO: 89 FL (ref 82–98)
PLATELET # BLD AUTO: 218 THOUSANDS/UL (ref 149–390)
PMV BLD AUTO: 9.3 FL (ref 8.9–12.7)
POTASSIUM SERPL-SCNC: 3.3 MMOL/L (ref 3.5–5.3)
RBC # BLD AUTO: 4.48 MILLION/UL (ref 3.88–5.62)
SODIUM SERPL-SCNC: 136 MMOL/L (ref 136–145)
WBC # BLD AUTO: 7.78 THOUSAND/UL (ref 4.31–10.16)

## 2017-10-17 PROCEDURE — 80048 BASIC METABOLIC PNL TOTAL CA: CPT | Performed by: PHYSICIAN ASSISTANT

## 2017-10-17 PROCEDURE — 85027 COMPLETE CBC AUTOMATED: CPT | Performed by: PHYSICIAN ASSISTANT

## 2017-10-17 PROCEDURE — C9113 INJ PANTOPRAZOLE SODIUM, VIA: HCPCS | Performed by: SURGERY

## 2017-10-17 RX ORDER — METOCLOPRAMIDE HYDROCHLORIDE 5 MG/ML
10 INJECTION INTRAMUSCULAR; INTRAVENOUS EVERY 6 HOURS SCHEDULED
Status: DISCONTINUED | OUTPATIENT
Start: 2017-10-17 | End: 2017-10-18

## 2017-10-17 RX ADMIN — DEXTROSE, SODIUM CHLORIDE, AND POTASSIUM CHLORIDE 125 ML/HR: 5; .45; .15 INJECTION INTRAVENOUS at 19:33

## 2017-10-17 RX ADMIN — DEXTROSE, SODIUM CHLORIDE, AND POTASSIUM CHLORIDE 125 ML/HR: 5; .45; .15 INJECTION INTRAVENOUS at 03:22

## 2017-10-17 RX ADMIN — ROPIVACAINE HYDROCHLORIDE: 5 INJECTION, SOLUTION EPIDURAL; INFILTRATION; PERINEURAL at 22:08

## 2017-10-17 RX ADMIN — PANTOPRAZOLE SODIUM 40 MG: 40 INJECTION, POWDER, FOR SOLUTION INTRAVENOUS at 08:41

## 2017-10-17 RX ADMIN — METOCLOPRAMIDE 10 MG: 5 INJECTION, SOLUTION INTRAMUSCULAR; INTRAVENOUS at 19:35

## 2017-10-17 RX ADMIN — DEXTROSE, SODIUM CHLORIDE, AND POTASSIUM CHLORIDE 125 ML/HR: 5; .45; .15 INJECTION INTRAVENOUS at 03:20

## 2017-10-17 RX ADMIN — DEXTROSE, SODIUM CHLORIDE, AND POTASSIUM CHLORIDE 125 ML/HR: 5; .45; .15 INJECTION INTRAVENOUS at 11:18

## 2017-10-17 RX ADMIN — NALBUPHINE HYDROCHLORIDE 5 MG: 10 INJECTION, SOLUTION INTRAMUSCULAR; INTRAVENOUS; SUBCUTANEOUS at 03:33

## 2017-10-17 RX ADMIN — DIPHENHYDRAMINE HYDROCHLORIDE 25 MG: 50 INJECTION, SOLUTION INTRAMUSCULAR; INTRAVENOUS at 08:41

## 2017-10-17 RX ADMIN — HEPARIN SODIUM 5000 UNITS: 5000 INJECTION, SOLUTION INTRAVENOUS; SUBCUTANEOUS at 20:26

## 2017-10-17 RX ADMIN — Medication 1 SPRAY: at 16:08

## 2017-10-17 RX ADMIN — ROPIVACAINE HYDROCHLORIDE: 5 INJECTION, SOLUTION EPIDURAL; INFILTRATION; PERINEURAL at 05:09

## 2017-10-17 RX ADMIN — METOCLOPRAMIDE 10 MG: 5 INJECTION, SOLUTION INTRAMUSCULAR; INTRAVENOUS at 12:33

## 2017-10-17 RX ADMIN — DIPHENHYDRAMINE HYDROCHLORIDE 25 MG: 50 INJECTION, SOLUTION INTRAMUSCULAR; INTRAVENOUS at 19:36

## 2017-10-17 RX ADMIN — HEPARIN SODIUM 5000 UNITS: 5000 INJECTION, SOLUTION INTRAVENOUS; SUBCUTANEOUS at 08:41

## 2017-10-17 RX ADMIN — Medication 1 SPRAY: at 22:31

## 2017-10-17 NOTE — PROGRESS NOTES
Anesthesia Progress Note - [facility]    Antonio Mendez MRN: [id number]  Unit/Bed#: -01 Encounter: [id number]        Epidural is runnin 1% Ropivacaine plus 2 ug/cc fentanyl at 12 cc/hr, bolus of 5 cc every 15 minutes prn      Plan:   Patient comfortable  Continue same        Assessment:   65 y o  male status post Ex lap, lysis of adhesions POD# 2    Subjective:  Current pain location(s): abdomen  Pain Scale:   2-5/10      Meds/Allergies   current meds:   Current Facility-Administered Medications   Medication Dose Route Frequency    dextrose 5 % and sodium chloride 0 45 % with KCl 20 mEq/L infusion  125 mL/hr Intravenous Continuous    diphenhydrAMINE (BENADRYL) injection 25 mg  25 mg Intravenous Q6H PRN    heparin (porcine) subcutaneous injection 5,000 Units  5,000 Units Subcutaneous Q12H [address]    HYDROmorphone (DILAUDID) 1 mg/mL injection 0 5 mg  0 5 mg Intravenous Q2H PRN    HYDROmorphone (DILAUDID) 1 mg/mL injection 0 5 mg  0 5 mg Intravenous Q1H PRN    HYDROmorphone (DILAUDID) 1 mg/mL injection 1 mg  1 mg Intravenous Q3H PRN    Menthol lozenge 5 4 mg  1 lozenge Mouth/Throat Q2H PRN    metoclopramide (REGLAN) injection 10 mg  10 mg Intravenous Q6H PRN    metoclopramide (REGLAN) injection 10 mg  10 mg Intravenous Q6H [address]    metoprolol (LOPRESSOR) injection 2 5 mg  2 5 mg Intravenous Q6H PRN    nalbuphine (NUBAIN) injection 5 mg  5 mg Intravenous Q3H PRN    ondansetron (ZOFRAN) injection 4 mg  4 mg Intravenous Q4H PRN    pantoprazole (PROTONIX) injection 40 mg  40 mg Intravenous Q24H VANE    phenol (CHLORASEPTIC) 1 4 % mucosal liquid 1 spray  1 spray Mouth/Throat Q2H PRN    ropivacaine 0 1% and fentaNYL 2 mcg/mL 250 mL PCEA   Epidural Continuous       No Known Allergies    Objective     Temp:  [98 3 °F (36 8 °C)-99 °F (37 2 °C)] 98 3 °F (36 8 °C)  HR:  [73-85] 84  Resp:  [16-18] 16  BP: (118-142)/(71-95) 142/87    SIGNATURE: Ira Matias MD  DATE: 2017  TIME: 2:24 PM    Please call  ( [facility] [phone]) with any questions

## 2017-10-17 NOTE — SOCIAL WORK
LOS 5 days  Not a bundle or a readmission  S/w pt and son at bedside this afternoon to discuss dcp  Pt lives with his wife, son and father in law in a 1 story home in [facility] with 1 JASON  He uses no DME and has no HHC or SNF hx  He is self care for ADLs, uses [facility] in [place], has a prescription insurance plan and does not have any difficulty paying for medications  He has no hx of Mental illness or D&A hx, works as a  and drives himself  At this time pt declines any CM dcp needs  CM reviewed discharge planning process including the following: identifying caregivers at home, preference for d/c planning needs, [facility] to [facility] program, availability of treatment team to discuss questions or concerns patient and/or family may have regarding diagnosis, plan of care, old or new medications and discharge planning   CM name and role reviewed  CM will continue to follow for care coordination and update assessment as necessary

## 2017-10-17 NOTE — MEDICAL STUDENT
MEDICAL STUDENT  Inpatient Progress Note for TRAINING ONLY  Not Part of Legal Medical Record       Progress Note - Geovanni Zelaya 65 y o  male MRN: [id number]    Unit/Bed#: -01 Encounter: [id number]      Assessment:  POD #2 s/p ex lap with lysis of adhesions  Patient is feeling better  Minor cough began this morning  Plan:  NPO  IVF  NGT  Pain control with epidural  Leave barajas catheter until epidural is removed  SI     Subjective:   Patient has no pain  Mild nonproductive cough began this morning  (-) flatus/BM/N/V  (+) itchy chest  No difficultly breathing or leg pain/swelling  Objective:     Vitals: Blood pressure 138/95, pulse 85, temperature 98 3 °F (36 8 °C), temperature source Oral, resp  rate 16, height 6' 1" (1 854 m), weight 93 kg (205 lb 0 4 oz), SpO2 93 %  ,Body mass index is 27 05 kg/m²  Intake/Output Summary (Last 24 hours) at 10/17/17 3780  Last data filed at 10/17/17 0756   Gross per 24 hour   Intake          2281 53 ml   Output             3400 ml   Net         -1118 47 ml      NGT output overnight: 250 ccs   - NGT was draining small amounts of blood at the time of examination, but the suction catheter contained brown, bile colored material    Barajas catheter output overnight: 3,050 ccs     Physical Exam: General appearance: alert, appears stated age, cooperative and no distress  Lungs: clear to auscultation bilaterally  Heart: regular rate and rhythm, S1, S2 normal, no murmur, click, rub or gallop  Abdomen: Soft, non distended  non tender to palpation  Bowel sounds heard in all four quadrants    Extremities: no edema, redness or tenderness in the calves or thighs     Invasive Devices     Peripheral Intravenous Line            Peripheral IV 10/15/17 Right Hand 1 day          Epidural Line            Epidural Catheter 10/15/17 1 day          Drain            NG/OG/Enteral Tube Nasogastric 18 Fr Right nares 1 day    Urethral Catheter Latex 16 Fr  1 day Medications:  heparin (porcine) subcutaneous injection 5,000 Units  Dose: 5,000 Units Freq: Every 12 hours scheduled Route: SC  Start: 10/15/17 1415      pantoprazole (PROTONIX) injection 40 mg  Dose: 40 mg Freq: Every 24 hours scheduled Route: IV  Start: 10/14/17 1100        dextrose 5 % and sodium chloride 0 45 % with KCl 20 mEq/L infusion  Rate: 125 mL/hr Freq: Continuous Route: IV  Last Dose: 125 mL/hr (10/17/17 0322)  Start: 10/14/17 1100      ropivacaine 0 1% and fentaNYL 2 mcg/mL 250 mL PCEA  Continuous Rate: 12 mL/hr  PCEA Dose: 5 mL  PCEA Lock-out: 10 Minutes  Max Doses per Hour: 3 doses/hr  Freq: Continuous Route: EP  Start: 10/15/17 1345        diphenhydrAMINE (BENADRYL) injection 25 mg  Dose: 25 mg Freq: Every 6 hours PRN Route: IV  PRN Reasons: itching,sleep  Start: 10/12/17 2031      HYDROmorphone (DILAUDID) 1 mg/mL injection 0 5 mg  Dose: 0 5 mg Freq: Every 1 hour PRN Route: IV  PRN Reason: breakthrough pain  Start: 10/15/17 1336      HYDROmorphone (DILAUDID) 1 mg/mL injection 0 5 mg  Dose: 0 5 mg Freq: Every 2 hour PRN Route: IV  PRN Reason: severe pain  Start: 10/12/17 0839   - Admin Instructions:   High alert medication   LOOK ALIKE SOUND ALIKE MED      HYDROmorphone (DILAUDID) 1 mg/mL injection 1 mg  Dose: 1 mg Freq: Every 3 hours PRN Route: IV  PRN Reasons: severe pain,breakthrough pain  Start: 10/12/17 0840      Menthol lozenge 5 4 mg  Dose: 1 lozenge Freq: Every 2 hour PRN Route: MT  PRN Reason: sore throat  Start: 10/16/17 0843      metoclopramide (REGLAN) injection 10 mg  Dose: 10 mg Freq: Every 6 hours PRN Route: IV  PRN Reason: nausea  Start: 10/13/17 0748      metoprolol (LOPRESSOR) injection 2 5 mg  Dose: 2 5 mg Freq: Every 6 hours PRN Route: IV  PRN Reason: high blood pressure  PRN Comment: SBP >140, hold for HR <50  Indications Comment: HTN  Start: 10/12/17 0909      nalbuphine (NUBAIN) injection 5 mg  Dose: 5 mg Freq: Every 3 hours PRN Route: IV  PRN Reason: pruritis  Start: 10/15/17 1335      ondansetron (ZOFRAN) injection 4 mg  Dose: 4 mg Freq: Every 4 hours PRN Route: IV  PRN Reasons: nausea,vomiting  Start: 10/12/17 0839      phenol (CHLORASEPTIC) 1 4 % mucosal liquid 1 spray  Dose: 1 spray Freq: Every 2 hour PRN Route: MT  PRN Reason: sore throat  Start: 10/12/17 2031          Lab, Imaging and other studies: I have personally reviewed pertinent reports      VTE Pharmacologic Prophylaxis: Heparin  VTE Mechanical Prophylaxis: sequential compression device

## 2017-10-17 NOTE — PROGRESS NOTES
Progress Note -Surgery PA  Elinor Dominguez 65 y o  male MRN: [id number]  Unit/Bed#: -01 Encounter: [id number]      Subjective/Objective     Subjective:  Patient improving each day  Patient states he does not have pain  Patient not passing gas and has not had a bowel movement  Patient denies nausea, vomiting  Patient is itchy and has been getting Benadryl intermittent  Anesthesia to see the patient today for epidural assessment  NG tube put out 350 cc in past 24 hours  Objective:     /95   Pulse 85   Temp 98 3 °F (36 8 °C) (Oral)   Resp 16   Ht 6' 1" (1 854 m)   Wt 93 kg (205 lb 0 4 oz)   SpO2 93%   BMI 27 05 kg/m²       Intake/Output Summary (Last 24 hours) at 10/17/17 0140  Last data filed at 10/17/17 0756   Gross per 24 hour   Intake          2281 53 ml   Output             3400 ml   Net         -1118 47 ml       Invasive Devices     Peripheral Intravenous Line            Peripheral IV 10/15/17 Right Hand 1 day          Epidural Line            Epidural Catheter 10/15/17 1 day          Drain            NG/OG/Enteral Tube Nasogastric 18 Fr Right nares 1 day    Urethral Catheter Latex 16 Fr  1 day                Physical Exam:  General appearance: alert, appears stated age and cooperative  Lungs: clear to auscultation bilaterally  Heart: regular rate and rhythm, S1, S2 normal, no murmur, click, rub or gallop  Abdomen:  Soft, nontender    Incision dressing clean and dry  Extremities: extremities normal, atraumatic, no cyanosis or edema      Current Facility-Administered Medications:     dextrose 5 % and sodium chloride 0 45 % with KCl 20 mEq/L infusion, 125 mL/hr, Intravenous, Continuous, Sal Rodriguez MD, Last Rate: 125 mL/hr at 10/17/17 0322, 125 mL/hr at 10/17/17 0322    diphenhydrAMINE (BENADRYL) injection 25 mg, 25 mg, Intravenous, Q6H PRN, Mirtha Amor PA-C, 25 mg at 10/16/17 2107    heparin (porcine) subcutaneous injection 5,000 Units, 5,000 Units, Subcutaneous, Q12H [address] Gonzalo Johansen MD, 5,000 Units at 10/16/17 2107    HYDROmorphone (DILAUDID) 1 mg/mL injection 0 5 mg, 0 5 mg, Intravenous, Q2H PRN, Fiordaliza Murillo MD    HYDROmorphone (DILAUDID) 1 mg/mL injection 0 5 mg, 0 5 mg, Intravenous, Q1H PRN, Amrik Thomas MD    HYDROmorphone (DILAUDID) 1 mg/mL injection 1 mg, 1 mg, Intravenous, Q3H PRN, Fiordaliza Murillo MD    Menthol lozenge 5 4 mg, 1 lozenge, Mouth/Throat, Q2H PRN, Chloe Ocampo PA-C, 5 4 mg at 10/16/17 0856    metoclopramide (REGLAN) injection 10 mg, 10 mg, Intravenous, Q6H PRN, Tata Ocampo PA-C, 10 mg at 10/13/17 1953    metoprolol (LOPRESSOR) injection 2 5 mg, 2 5 mg, Intravenous, Q6H PRN, Jaden Thapa PA-C    nalbuphine (NUBAIN) injection 5 mg, 5 mg, Intravenous, Q3H PRN, Amrik Thomas MD, 5 mg at 10/17/17 0333    ondansetron (ZOFRAN) injection 4 mg, 4 mg, Intravenous, Q4H PRN, Fiordaliza Murillo MD, 4 mg at 10/13/17 1610    pantoprazole (PROTONIX) injection 40 mg, 40 mg, Intravenous, Q24H [facility], Fiordaliza Murillo MD, 40 mg at 10/16/17 0858    phenol (CHLORASEPTIC) 1 4 % mucosal liquid 1 spray, 1 spray, Mouth/Throat, Q2H PRN, Robby Skinner PA-C, 1 spray at 10/16/17 0707    ropivacaine 0 1% and fentaNYL 2 mcg/mL 250 mL PCEA, , Epidural, Continuous, Amrik Thomas MD              Lab, Imaging and other studies:  I have personally reviewed pertinent lab results  , CBC:   Lab Results   Component Value Date    WBC 7 78 10/17/2017    HGB 12 9 10/17/2017    HCT 39 9 10/17/2017    MCV 89 10/17/2017     10/17/2017    MCH 28 8 10/17/2017    MCHC 32 3 10/17/2017    RDW 13 1 10/17/2017    MPV 9 3 10/17/2017   , CMP:   Lab Results   Component Value Date     10/17/2017    K 3 3 (L) 10/17/2017     10/17/2017    CO2 28 10/17/2017    ANIONGAP 6 10/17/2017    BUN 7 10/17/2017    CREATININE 0 59 (L) 10/17/2017    GLUCOSE 111 10/17/2017    CALCIUM 8 1 (L) 10/17/2017    EGFR 113 10/17/2017     Labs in chart were reviewed    Lab Results   Component Value Date    WBC 7 78 10/17/2017    HGB 12 9 10/17/2017    HCT 39 9 10/17/2017     10/17/2017     Lab Results   Component Value Date     10/17/2017    K 3 3 (L) 10/17/2017     10/17/2017    CO2 28 10/17/2017    BUN 7 10/17/2017    CREATININE 0 59 (L) 10/17/2017    GLUCOSE 111 10/17/2017       VTE Pharmacologic Prophylaxis: Heparin  VTE Mechanical Prophylaxis: sequential compression device    Assessment:    71-year-old male with small-bowel obstruction, postop day 2 , status post lysis of adhesions    Plan:  -awaiting return of bowel function  Will continue NG tube/IV fluids today  Continue to monitor output from NG tube    -anesthesia will assess patient today with epidural   -continue Benadryl for intermittent pruritus  -DVT prophylaxis  -ice chips/lozenges for    Marcelino Wheatley PA-C

## 2017-10-17 NOTE — PLAN OF CARE
Problem: DISCHARGE PLANNING - CARE MANAGEMENT  Goal: Discharge to post-acute care or home with appropriate resources  INTERVENTIONS:  - Conduct assessment to determine patient/family and health care team treatment goals, and need for post-acute services based on payer coverage, community resources, and patient preferences, and barriers to discharge  - Address psychosocial, clinical, and financial barriers to discharge as identified in assessment in conjunction with the patient/family and health care team  - Arrange appropriate level of post-acute services according to patients   needs and preference and payer coverage in collaboration with the physician and health care team  - Communicate with and update the patient/family, physician, and health care team regarding progress on the discharge plan  - Arrange appropriate transportation to post-acute venues  Outcome: Progressing  LOS 5 days  Not a bundle or a readmission  S/w pt and son at bedside this afternoon to discuss dcp  Pt lives with his wife, son and father in law in a 1 story home in [place] with 1 JASON  He uses no DME and has no HHC or SNF hx  He is self care for ADLs, [facility] [place], has a prescription insurance plan and does not have any difficulty paying for medications  He has no hx of Mental illness or D&A hx, works as a  and drives himself  At this time pt declines any CM dcp needs  CM reviewed discharge planning process including the following: identifying caregivers at home, preference for d/c planning needs, [facility] to Bed program, availability of treatment team to discuss questions or concerns patient and/or family may have regarding diagnosis, plan of care, old or new medications and discharge planning   CM name and role reviewed  CM will continue to follow for care coordination and update assessment as necessary

## 2017-10-17 NOTE — PLAN OF CARE
GASTROINTESTINAL - ADULT     Maintains or returns to baseline bowel function Not Progressing          GASTROINTESTINAL - ADULT     Minimal or absence of nausea and/or vomiting Progressing     Maintains adequate nutritional intake Progressing        GENITOURINARY - ADULT     Maintains or returns to baseline urinary function Progressing     Absence of urinary retention Progressing        Knowledge Deficit     Patient/family/caregiver demonstrates understanding of disease process, treatment plan, medications, and discharge instructions Progressing        METABOLIC, FLUID AND ELECTROLYTES - ADULT     Fluid balance maintained Progressing        Nutrition/Hydration-ADULT     Nutrient/Hydration intake appropriate for improving, restoring or maintaining nutritional needs Progressing        PAIN - ADULT     Verbalizes/displays adequate comfort level or baseline comfort level Progressing        Potential for Falls     Patient will remain free of falls Progressing

## 2017-10-17 NOTE — PROGRESS NOTES
Patient's NGT output noted to be red  VSS, patient denies pain  Rose Whitaker, surgical PA, aware  Ordered to flush NGT with 20 cc Qshift  Will continue to monitor

## 2017-10-18 ENCOUNTER — APPOINTMENT (INPATIENT)
Dept: RADIOLOGY | Facility: HOSPITAL | Age: 57
DRG: 336 | End: 2017-10-18
Payer: COMMERCIAL

## 2017-10-18 LAB
ANION GAP SERPL CALCULATED.3IONS-SCNC: 4 MMOL/L (ref 4–13)
BUN SERPL-MCNC: 8 MG/DL (ref 5–25)
CALCIUM SERPL-MCNC: 8.6 MG/DL (ref 8.3–10.1)
CHLORIDE SERPL-SCNC: 101 MMOL/L (ref 100–108)
CO2 SERPL-SCNC: 30 MMOL/L (ref 21–32)
CREAT SERPL-MCNC: 0.65 MG/DL (ref 0.6–1.3)
ERYTHROCYTE [DISTWIDTH] IN BLOOD BY AUTOMATED COUNT: 13.1 % (ref 11.6–15.1)
GFR SERPL CREATININE-BSD FRML MDRD: 109 ML/MIN/1.73SQ M
GLUCOSE SERPL-MCNC: 112 MG/DL (ref 65–140)
HCT VFR BLD AUTO: 39.8 % (ref 36.5–49.3)
HGB BLD-MCNC: 13 G/DL (ref 12–17)
MCH RBC QN AUTO: 29.3 PG (ref 26.8–34.3)
MCHC RBC AUTO-ENTMCNC: 32.7 G/DL (ref 31.4–37.4)
MCV RBC AUTO: 90 FL (ref 82–98)
PLATELET # BLD AUTO: 225 THOUSANDS/UL (ref 149–390)
PMV BLD AUTO: 9 FL (ref 8.9–12.7)
POTASSIUM SERPL-SCNC: 3.3 MMOL/L (ref 3.5–5.3)
RBC # BLD AUTO: 4.43 MILLION/UL (ref 3.88–5.62)
SODIUM SERPL-SCNC: 135 MMOL/L (ref 136–145)
WBC # BLD AUTO: 9.37 THOUSAND/UL (ref 4.31–10.16)

## 2017-10-18 PROCEDURE — C9113 INJ PANTOPRAZOLE SODIUM, VIA: HCPCS | Performed by: SURGERY

## 2017-10-18 PROCEDURE — 74022 RADEX COMPL AQT ABD SERIES: CPT

## 2017-10-18 PROCEDURE — 85027 COMPLETE CBC AUTOMATED: CPT | Performed by: SURGERY

## 2017-10-18 PROCEDURE — G8978 MOBILITY CURRENT STATUS: HCPCS

## 2017-10-18 PROCEDURE — 97163 PT EVAL HIGH COMPLEX 45 MIN: CPT

## 2017-10-18 PROCEDURE — G8979 MOBILITY GOAL STATUS: HCPCS

## 2017-10-18 PROCEDURE — 80048 BASIC METABOLIC PNL TOTAL CA: CPT | Performed by: SURGERY

## 2017-10-18 RX ORDER — HEPARIN SODIUM 5000 [USP'U]/ML
5000 INJECTION, SOLUTION INTRAVENOUS; SUBCUTANEOUS EVERY 8 HOURS SCHEDULED
Status: DISCONTINUED | OUTPATIENT
Start: 2017-10-18 | End: 2017-10-22 | Stop reason: HOSPADM

## 2017-10-18 RX ORDER — POTASSIUM CHLORIDE 14.9 MG/ML
20 INJECTION INTRAVENOUS
Status: COMPLETED | OUTPATIENT
Start: 2017-10-18 | End: 2017-10-19

## 2017-10-18 RX ORDER — BISACODYL 10 MG
10 SUPPOSITORY, RECTAL RECTAL DAILY
Status: DISCONTINUED | OUTPATIENT
Start: 2017-10-18 | End: 2017-10-21

## 2017-10-18 RX ORDER — MAGNESIUM SULFATE 1 G/100ML
1 INJECTION INTRAVENOUS ONCE
Status: COMPLETED | OUTPATIENT
Start: 2017-10-18 | End: 2017-10-19

## 2017-10-18 RX ORDER — METOCLOPRAMIDE HYDROCHLORIDE 5 MG/ML
10 INJECTION INTRAMUSCULAR; INTRAVENOUS EVERY 6 HOURS SCHEDULED
Status: COMPLETED | OUTPATIENT
Start: 2017-10-18 | End: 2017-10-18

## 2017-10-18 RX ADMIN — METOCLOPRAMIDE 10 MG: 5 INJECTION, SOLUTION INTRAMUSCULAR; INTRAVENOUS at 18:13

## 2017-10-18 RX ADMIN — DEXTROSE, SODIUM CHLORIDE, AND POTASSIUM CHLORIDE 125 ML/HR: 5; .45; .15 INJECTION INTRAVENOUS at 03:16

## 2017-10-18 RX ADMIN — Medication 1 SPRAY: at 22:18

## 2017-10-18 RX ADMIN — BISACODYL 10 MG: 10 SUPPOSITORY RECTAL at 20:41

## 2017-10-18 RX ADMIN — HEPARIN SODIUM 5000 UNITS: 5000 INJECTION, SOLUTION INTRAVENOUS; SUBCUTANEOUS at 08:09

## 2017-10-18 RX ADMIN — METOCLOPRAMIDE 10 MG: 5 INJECTION, SOLUTION INTRAMUSCULAR; INTRAVENOUS at 00:12

## 2017-10-18 RX ADMIN — METOCLOPRAMIDE 10 MG: 5 INJECTION, SOLUTION INTRAMUSCULAR; INTRAVENOUS at 08:10

## 2017-10-18 RX ADMIN — METOCLOPRAMIDE 10 MG: 5 INJECTION, SOLUTION INTRAMUSCULAR; INTRAVENOUS at 14:11

## 2017-10-18 RX ADMIN — Medication: at 10:16

## 2017-10-18 RX ADMIN — MENTHOL 5.4 MG: 5.4 LOZENGE ORAL at 14:11

## 2017-10-18 RX ADMIN — HEPARIN SODIUM 5000 UNITS: 5000 INJECTION, SOLUTION INTRAVENOUS; SUBCUTANEOUS at 22:18

## 2017-10-18 RX ADMIN — DEXTROSE, SODIUM CHLORIDE, AND POTASSIUM CHLORIDE 125 ML/HR: 5; .45; .15 INJECTION INTRAVENOUS at 11:35

## 2017-10-18 RX ADMIN — MENTHOL 5.4 MG: 5.4 LOZENGE ORAL at 12:15

## 2017-10-18 RX ADMIN — PANTOPRAZOLE SODIUM 40 MG: 40 INJECTION, POWDER, FOR SOLUTION INTRAVENOUS at 08:09

## 2017-10-18 RX ADMIN — Medication 1 SPRAY: at 03:16

## 2017-10-18 RX ADMIN — METOCLOPRAMIDE 10 MG: 5 INJECTION, SOLUTION INTRAMUSCULAR; INTRAVENOUS at 23:42

## 2017-10-18 RX ADMIN — POTASSIUM CHLORIDE 20 MEQ: 200 INJECTION, SOLUTION INTRAVENOUS at 14:07

## 2017-10-18 RX ADMIN — DEXTROSE, SODIUM CHLORIDE, AND POTASSIUM CHLORIDE 125 ML/HR: 5; .45; .15 INJECTION INTRAVENOUS at 19:27

## 2017-10-18 RX ADMIN — MAGNESIUM SULFATE HEPTAHYDRATE 1 G: 1 INJECTION, SOLUTION INTRAVENOUS at 17:02

## 2017-10-18 RX ADMIN — HEPARIN SODIUM 5000 UNITS: 5000 INJECTION, SOLUTION INTRAVENOUS; SUBCUTANEOUS at 14:11

## 2017-10-18 RX ADMIN — POTASSIUM CHLORIDE 20 MEQ: 200 INJECTION, SOLUTION INTRAVENOUS at 12:27

## 2017-10-18 RX ADMIN — DIPHENHYDRAMINE HYDROCHLORIDE 25 MG: 50 INJECTION, SOLUTION INTRAMUSCULAR; INTRAVENOUS at 02:23

## 2017-10-18 NOTE — PLAN OF CARE
Problem: PHYSICAL THERAPY ADULT  Goal: Performs mobility at highest level of function for planned discharge setting  See evaluation for individualized goals  Treatment/Interventions: Functional transfer training, LE strengthening/ROM, Therapeutic exercise, Endurance training, Patient/family training, Equipment eval/education, Bed mobility, Gait training  Equipment Recommended: Jose Armando Marquez       See flowsheet documentation for full assessment, interventions and recommendations  Prognosis: Good  Problem List: Decreased strength, Decreased endurance, Impaired balance, Decreased mobility, Decreased safety awareness, Decreased skin integrity, Pain  Assessment: Pt is a 65 y o  male seen for PT evaluation s/p admit to [address] [place] [address] on 10/12/2017 w/ Small bowel obstruction  Order placed for PT  Comorbidities affecting pt's physical performance listed above  Personal factors affecting pt at time of IE include: inability to perform IADLs, inability to perform ADLs, inability to ambulate household distances and limited insight into impairments  Prior to admission, pt was independent w/ all functional mobility w/ out AD, lives in one floor environment, has 1 JASON and lives with son and wife  Upon evaluation: Pt requires SBA for sit to stand, min A for ambulation with standard walker  SW replaced with RW after evaluation  (Please find full objective findings from PT assessment regarding body systems outlined above)  Impairments and limitations also listed above, especially due to  weakness, impaired balance, decreased endurance, gait deviations, pain, decreased activity tolerance, decreased safety awareness, impaired judgement and fall risk  The following objective measures performed on IE also reveal limitations: Barthel Index 55/100   Pt's clinical presentation is currently unstable/unpredictable seen in pt's presentation of decreased safety awareness, significant pain with mobility, and complaints of dizziness with mobility  Pt to benefit from continued skilled PT tx while in hospital and upon DC to address deficits as defined above and maximize level of functional mobility  From PT/mobility standpoint, recommendation at time of d/c would be Home PT and home with family support pending progress in order to maximize pt's functional independence and consistency w/ mobility in order to facilitate return to PLOF  Recommend progression of ambulation, initiation of HEP, and dynamic balance activities as appropriate  Recommendation: Home with family support, Home PT (pending progress)          See flowsheet documentation for full assessment

## 2017-10-18 NOTE — PROGRESS NOTES
Wasted 57 mL from epidural pump; ropivicaine 0 1% and fentanyl 2mcg  Witnessed by [employer]

## 2017-10-18 NOTE — PROGRESS NOTES
Progress Note -Surgery PA  Ramone Norwood 65 y o  male MRN: [id number]  Unit/Bed#: -01 Encounter: [id number]      Subjective/Objective     Subjective:   Patient has not been passing gas and has not had a bowel movement  NG tube output 700 cc past 24 hours  The patient had have NG tube repositioned twice last night and is complaining of intense throat pain  Epidural and García still in place  Patient is being seen by anesthesia      Objective:     /93   Pulse 91   Temp 99 2 °F (37 3 °C) (Oral)   Resp 16   Ht 6' 1" (1 854 m)   Wt 93 kg (205 lb 0 4 oz)   SpO2 95%   BMI 27 05 kg/m²       Intake/Output Summary (Last 24 hours) at 10/18/17 0735  Last data filed at 10/18/17 0330   Gross per 24 hour   Intake          4338 33 ml   Output             2650 ml   Net          1688 33 ml       Invasive Devices     Peripheral Intravenous Line            Peripheral IV 10/15/17 Right Hand 2 days          Epidural Line            Epidural Catheter 10/15/17 2 days          Drain            NG/OG/Enteral Tube Nasogastric 18 Fr Right nares 2 days    Urethral Catheter Latex 16 Fr  2 days                Physical Exam:  General appearance: alert, appears stated age and cooperative  Lungs: clear to auscultation bilaterally  Heart: regular rate and rhythm, S1, S2 normal, no murmur, click, rub or gallop  Abdomen: soft, non-tender; bowel sounds normal; no masses,  no organomegaly dressing clean and dry  Extremities: extremities normal, atraumatic, no cyanosis or edema      Current Facility-Administered Medications:     dextrose 5 % and sodium chloride 0 45 % with KCl 20 mEq/L infusion, 125 mL/hr, Intravenous, Continuous, Keily Rodriguez MD, Last Rate: 125 mL/hr at 10/18/17 0316, 125 mL/hr at 10/18/17 0316    diphenhydrAMINE (BENADRYL) injection 25 mg, 25 mg, Intravenous, Q6H PRN, Angeles Pacheco PA-C, 25 mg at 10/18/17 0223    heparin (porcine) subcutaneous injection 5,000 Units, 5,000 Units, Subcutaneous, Q12H [address], Larina Starcher Lynda Perez MD, 5,000 Units at 10/17/17 2026    HYDROmorphone (DILAUDID) 1 mg/mL injection 0 5 mg, 0 5 mg, Intravenous, Q2H PRN, Krystal Welsh MD    HYDROmorphone (DILAUDID) 1 mg/mL injection 0 5 mg, 0 5 mg, Intravenous, Q1H PRN, Myranda Payne MD    HYDROmorphone (DILAUDID) 1 mg/mL injection 1 mg, 1 mg, Intravenous, Q3H PRN, Krystal Welsh MD    Menthol lozenge 5 4 mg, 1 lozenge, Mouth/Throat, Q2H PRN, Chloe Ocampo PA-C, 5 4 mg at 10/16/17 0856    metoclopramide (REGLAN) injection 10 mg, 10 mg, Intravenous, Q6H PRN, Daysi Ocampo PA-C, 10 mg at 10/13/17 1953    metoclopramide (REGLAN) injection 10 mg, 10 mg, Intravenous, Q6H [facility], Leodan Mendiola DO, 10 mg at 10/18/17 0012    metoprolol (LOPRESSOR) injection 2 5 mg, 2 5 mg, Intravenous, Q6H PRN, Shaheen Perdomo PA-C    nalbuphine (NUBAIN) injection 5 mg, 5 mg, Intravenous, Q3H PRN, Myranda Payne MD, 5 mg at 10/17/17 0333    ondansetron (ZOFRAN) injection 4 mg, 4 mg, Intravenous, Q4H PRN, Krystal Welsh MD, 4 mg at 10/13/17 1610    pantoprazole (PROTONIX) injection 40 mg, 40 mg, Intravenous, Q24H [facility], Krystal Welsh MD, 40 mg at 10/17/17 0841    phenol (CHLORASEPTIC) 1 4 % mucosal liquid 1 spray, 1 spray, Mouth/Throat, Q2H PRN, Alla Brooks PA-C, 1 spray at 10/18/17 0316    ropivacaine 0 1% and fentaNYL 2 mcg/mL 250 mL PCEA, , Epidural, Continuous, Myranda Payne MD              Lab, Imaging and other studies:  I have personally reviewed pertinent lab results    , CBC:   Lab Results   Component Value Date    WBC 9 37 10/18/2017    HGB 13 0 10/18/2017    HCT 39 8 10/18/2017    MCV 90 10/18/2017     10/18/2017    MCH 29 3 10/18/2017    MCHC 32 7 10/18/2017    RDW 13 1 10/18/2017    MPV 9 0 10/18/2017   , CMP:   Lab Results   Component Value Date     (L) 10/18/2017    K 3 3 (L) 10/18/2017     10/18/2017    CO2 30 10/18/2017    ANIONGAP 4 10/18/2017    BUN 8 10/18/2017    CREATININE 0 65 10/18/2017    GLUCOSE 112 10/18/2017    CALCIUM 8 6 10/18/2017    EGFR 109 10/18/2017     Labs in chart were reviewed  Lab Results   Component Value Date    WBC 9 37 10/18/2017    HGB 13 0 10/18/2017    HCT 39 8 10/18/2017     10/18/2017     Lab Results   Component Value Date     (L) 10/18/2017    K 3 3 (L) 10/18/2017     10/18/2017    CO2 30 10/18/2017    BUN 8 10/18/2017    CREATININE 0 65 10/18/2017    GLUCOSE 112 10/18/2017       VTE Pharmacologic Prophylaxis: Heparin  VTE Mechanical Prophylaxis: sequential compression device    Assessment:  52-year-old male with small-bowel obstruction, postop day 3 , status post lysis of adhesions     Plan:  -awaiting return of bowel function  Will continue NG tube/IV fluids    Continue to monitor NG tube output   -anesthesia following patient for epidural   -Benadryl and melatonin at night as needed  -DVT prophylaxis  -ice chips/lozenges for throat    Marc Small PA-C

## 2017-10-18 NOTE — PROGRESS NOTES
POD 1  The patient was seen  VSS  The pain was controlled well  The surgeon requested to D/C epidural catheter  The epidural catheter was removed, no erythema, no bleeding  the black tip was intact  Sensory and motor function is intact  No anesthesia complication was found

## 2017-10-18 NOTE — PLAN OF CARE
GASTROINTESTINAL - ADULT     Maintains or returns to baseline bowel function Not Progressing     Maintains adequate nutritional intake Not Progressing          GASTROINTESTINAL - ADULT     Minimal or absence of nausea and/or vomiting Progressing        GENITOURINARY - ADULT     Maintains or returns to baseline urinary function Progressing     Absence of urinary retention Progressing        Knowledge Deficit     Patient/family/caregiver demonstrates understanding of disease process, treatment plan, medications, and discharge instructions Progressing        METABOLIC, FLUID AND ELECTROLYTES - ADULT     Fluid balance maintained Progressing        Nutrition/Hydration-ADULT     Nutrient/Hydration intake appropriate for improving, restoring or maintaining nutritional needs Progressing        PAIN - ADULT     Verbalizes/displays adequate comfort level or baseline comfort level Progressing        Potential for Falls     Patient will remain free of falls Progressing

## 2017-10-18 NOTE — PHYSICAL THERAPY NOTE
PHYSICAL THERAPY EVALUATION  NAME: Ang Geiger  AGE:   65 y o  MRN:  [id number]  ADMIT DX: Small bowel obstruction [K56 609]  Abdominal pain [R10 9]  Nausea & vomiting [R11 2]    PMH:   Past Medical History:   Diagnosis Date    Hypertension     Migraine      LENGTH OF STAY: 6     10/18/17 1455   Pain Assessment   Pain Assessment 0-10   Pain Score 5   Pain Type Surgical pain   Pain Location Abdomen   Hospital Pain Intervention(s) Ambulation/increased activity   Response to Interventions tolerates fairly   Home Living   Type of [address] One level;Stairs to enter with rails  (1 JASON)   Additional Comments Ambulates independently without AD at baseline  Prior Function   Level of [place] Independent with ADLs and functional mobility   Lives With Spouse; Son   Nikita Help From Family   ADL Assistance Independent   IADLs Independent   Falls in the last 6 months 0   Vocational Full time employment  (night shift)   Restrictions/Precautions   Weight Bearing Precautions Per Order No   Other Precautions Impulsive;Multiple lines;Telemetry; Fall Risk;Pain  (NG tube)   General   Family/Caregiver Present Yes  (son)   Cognition   Overall Cognitive Status WFL   Arousal/Participation Cooperative   Orientation Level Oriented X4   Memory Within functional limits   Following Commands Follows all commands and directions without difficulty   RLE Assessment   RLE Assessment X   Strength RLE   RLE Overall Strength 4/5  (grossly)   LLE Assessment   LLE Assessment X   Strength LLE   LLE Overall Strength 4/5  (grossly)   Bed Mobility   Supine to Sit Unable to assess  (OOB in chair pre/post evaluation)   Transfers   Sit to Stand 5  Supervision   Additional items Increased time required; Impulsive;Verbal cues   Stand to Sit 4  Minimal assistance   Additional items Assist x 1; Increased time required;Verbal cues   Stand pivot 4  Minimal assistance   Additional items Assist x 1; Increased time required;Verbal cues  (with standard walker)   Ambulation/Elevation   Gait pattern Forward Flexion;Decreased foot clearance; Short stride; Excessively slow  (guarded to reduce pain)   Gait Assistance 4  Minimal assist   Additional items Assist x 1;Verbal cues   Assistive Device Standard walker  (replaced with RW after evaluation)   Distance 35`x1  (declined further mobility due to pain, dizziness)   Balance   Static Sitting Fair +   Dynamic Sitting Fair   Static Standing Fair   Dynamic Standing Fair -   Ambulatory Fair -   Endurance Deficit   Endurance Deficit Yes   Endurance Deficit Description limited ambulation distance, pain/dizziness   Activity Tolerance   Activity Tolerance Treatment limited secondary to medical complications (Comment); Patient limited by pain   Nurse Made Aware Per RN Pedrito Godinez, pt appropriate to evaluate   Assessment   Prognosis Good   Problem List Decreased strength;Decreased endurance; Impaired balance;Decreased mobility; Decreased safety awareness;Decreased skin integrity;Pain   Goals   Patient Goals none stated   STG Expiration Date 10/27/17   Short Term Goal #1 Pt will be able to: (1) perform bed mobility with mod I (2) perform sit to stand with mod I (3) ambulate 300` with mod I and least restrictive AD (4) initiate HEP (5) increase standing balance by 1 grade   Treatment Day 0   Plan   Treatment/Interventions Functional transfer training;LE strengthening/ROM; Therapeutic exercise; Endurance training;Patient/family training;Equipment eval/education; Bed mobility;Gait training   PT Frequency 5x/wk   Recommendation   Recommendation Home with family support;Home PT  (pending progress)   Equipment Recommended Walker   Barthel Index   Feeding 0   Bathing 0   Grooming Score 5   Dressing Score 5   Bladder Score 10   Bowels Score 10   Toilet Use Score 5   Transfers (Bed/Chair) Score 10   Mobility (Level Surface) Score 10   Stairs Score 0   Barthel Index Score 55       Assessment: Pt is a 65 y o  male seen for PT evaluation s/p admit to [facility] on 10/12/2017 w/ Small bowel obstruction  Order placed for PT  Comorbidities affecting pt's physical performance listed above  Personal factors affecting pt at time of IE include: inability to perform IADLs, inability to perform ADLs, inability to ambulate household distances and limited insight into impairments  Prior to admission, pt was independent w/ all functional mobility w/ out AD, lives in one floor environment, has 1 JASON and lives with son and wife  Upon evaluation: Pt requires SBA for sit to stand, min A for ambulation with standard walker  SW replaced with RW after evaluation  (Please find full objective findings from PT assessment regarding body systems outlined above)  Impairments and limitations also listed above, especially due to  weakness, impaired balance, decreased endurance, gait deviations, pain, decreased activity tolerance, decreased safety awareness, impaired judgement and fall risk  The following objective measures performed on IE also reveal limitations: Barthel Index 55/100  Pt's clinical presentation is currently unstable/unpredictable seen in pt's presentation of decreased safety awareness, significant pain with mobility, and complaints of dizziness with mobility  Pt to benefit from continued skilled PT tx while in hospital and upon DC to address deficits as defined above and maximize level of functional mobility  From PT/mobility standpoint, recommendation at time of d/c would be Home PT and home with family support pending progress in order to maximize pt's functional independence and consistency w/ mobility in order to facilitate return to PLOF  Recommend progression of ambulation, initiation of HEP, and dynamic balance activities as appropriate        Shade Delgado, PT,DPT

## 2017-10-19 LAB
ANION GAP SERPL CALCULATED.3IONS-SCNC: 8 MMOL/L (ref 4–13)
BUN SERPL-MCNC: 7 MG/DL (ref 5–25)
CALCIUM SERPL-MCNC: 8.7 MG/DL (ref 8.3–10.1)
CHLORIDE SERPL-SCNC: 98 MMOL/L (ref 100–108)
CO2 SERPL-SCNC: 28 MMOL/L (ref 21–32)
CREAT SERPL-MCNC: 0.57 MG/DL (ref 0.6–1.3)
ERYTHROCYTE [DISTWIDTH] IN BLOOD BY AUTOMATED COUNT: 13 % (ref 11.6–15.1)
GFR SERPL CREATININE-BSD FRML MDRD: 115 ML/MIN/1.73SQ M
GLUCOSE SERPL-MCNC: 120 MG/DL (ref 65–140)
HCT VFR BLD AUTO: 41.3 % (ref 36.5–49.3)
HGB BLD-MCNC: 13.5 G/DL (ref 12–17)
MAGNESIUM SERPL-MCNC: 2 MG/DL (ref 1.6–2.6)
MCH RBC QN AUTO: 29 PG (ref 26.8–34.3)
MCHC RBC AUTO-ENTMCNC: 32.7 G/DL (ref 31.4–37.4)
MCV RBC AUTO: 89 FL (ref 82–98)
PLATELET # BLD AUTO: 276 THOUSANDS/UL (ref 149–390)
PMV BLD AUTO: 9.4 FL (ref 8.9–12.7)
POTASSIUM SERPL-SCNC: 3.5 MMOL/L (ref 3.5–5.3)
RBC # BLD AUTO: 4.66 MILLION/UL (ref 3.88–5.62)
SODIUM SERPL-SCNC: 134 MMOL/L (ref 136–145)
WBC # BLD AUTO: 8.83 THOUSAND/UL (ref 4.31–10.16)

## 2017-10-19 PROCEDURE — 97530 THERAPEUTIC ACTIVITIES: CPT

## 2017-10-19 PROCEDURE — 83735 ASSAY OF MAGNESIUM: CPT | Performed by: SURGERY

## 2017-10-19 PROCEDURE — 80048 BASIC METABOLIC PNL TOTAL CA: CPT | Performed by: SURGERY

## 2017-10-19 PROCEDURE — C9113 INJ PANTOPRAZOLE SODIUM, VIA: HCPCS | Performed by: SURGERY

## 2017-10-19 PROCEDURE — 85027 COMPLETE CBC AUTOMATED: CPT | Performed by: SURGERY

## 2017-10-19 PROCEDURE — 97116 GAIT TRAINING THERAPY: CPT

## 2017-10-19 RX ORDER — OXYCODONE HYDROCHLORIDE AND ACETAMINOPHEN 5; 325 MG/1; MG/1
1 TABLET ORAL EVERY 4 HOURS PRN
Status: DISCONTINUED | OUTPATIENT
Start: 2017-10-19 | End: 2017-10-22 | Stop reason: HOSPADM

## 2017-10-19 RX ORDER — POTASSIUM CHLORIDE 14.9 MG/ML
20 INJECTION INTRAVENOUS
Status: COMPLETED | OUTPATIENT
Start: 2017-10-19 | End: 2017-10-19

## 2017-10-19 RX ORDER — ACETAMINOPHEN 325 MG/1
650 TABLET ORAL EVERY 6 HOURS PRN
Status: DISCONTINUED | OUTPATIENT
Start: 2017-10-19 | End: 2017-10-22 | Stop reason: HOSPADM

## 2017-10-19 RX ORDER — OXYCODONE HYDROCHLORIDE AND ACETAMINOPHEN 5; 325 MG/1; MG/1
2 TABLET ORAL EVERY 4 HOURS PRN
Status: DISCONTINUED | OUTPATIENT
Start: 2017-10-19 | End: 2017-10-22 | Stop reason: HOSPADM

## 2017-10-19 RX ADMIN — PANTOPRAZOLE SODIUM 40 MG: 40 INJECTION, POWDER, FOR SOLUTION INTRAVENOUS at 09:03

## 2017-10-19 RX ADMIN — POTASSIUM CHLORIDE 20 MEQ: 200 INJECTION, SOLUTION INTRAVENOUS at 11:50

## 2017-10-19 RX ADMIN — DEXTROSE, SODIUM CHLORIDE, AND POTASSIUM CHLORIDE 125 ML/HR: 5; .45; .15 INJECTION INTRAVENOUS at 03:18

## 2017-10-19 RX ADMIN — HEPARIN SODIUM 5000 UNITS: 5000 INJECTION, SOLUTION INTRAVENOUS; SUBCUTANEOUS at 22:08

## 2017-10-19 RX ADMIN — HEPARIN SODIUM 5000 UNITS: 5000 INJECTION, SOLUTION INTRAVENOUS; SUBCUTANEOUS at 13:02

## 2017-10-19 RX ADMIN — BISACODYL 10 MG: 10 SUPPOSITORY RECTAL at 14:50

## 2017-10-19 RX ADMIN — DEXTROSE, SODIUM CHLORIDE, AND POTASSIUM CHLORIDE 125 ML/HR: 5; .45; .15 INJECTION INTRAVENOUS at 16:25

## 2017-10-19 RX ADMIN — HEPARIN SODIUM 5000 UNITS: 5000 INJECTION, SOLUTION INTRAVENOUS; SUBCUTANEOUS at 05:20

## 2017-10-19 RX ADMIN — POTASSIUM CHLORIDE 20 MEQ: 200 INJECTION, SOLUTION INTRAVENOUS at 09:03

## 2017-10-19 RX ADMIN — ACETAMINOPHEN 650 MG: 325 TABLET ORAL at 17:14

## 2017-10-19 RX ADMIN — Medication: at 11:17

## 2017-10-19 NOTE — PLAN OF CARE
Problem: PHYSICAL THERAPY ADULT  Goal: Performs mobility at highest level of function for planned discharge setting  See evaluation for individualized goals  Treatment/Interventions: Functional transfer training, LE strengthening/ROM, Therapeutic exercise, Endurance training, Patient/family training, Equipment eval/education, Bed mobility, Gait training  Equipment Recommended: [place]       See flowsheet documentation for full assessment, interventions and recommendations  Outcome: Progressing  Prognosis: Good  Problem List: Decreased strength, Decreased endurance, Impaired balance, Decreased mobility, Decreased safety awareness, Decreased skin integrity, Pain  Assessment: Pt did make progress this session  Pt was able to amb 2x the distance compared to yesterday, and progressed to unilateral support instead of BUE support  Additionally pt was able to particiapte in longer standing tolerance/balance/ postural activites, being upright for ther ex > 5 min   However, pt still does require intermittent physical A for ambulation, and needs to perform stairs prior to DC  Recommend nursing/staff/family continue pt ambulation with rolling walker in halls throughout the day, and PT focuses on progressing to lesser restrictive device, stairs, posture, and balance activities  Barriers to Discharge: Inaccessible home environment     Recommendation: Home with family support, Home PT          See flowsheet documentation for full assessment

## 2017-10-19 NOTE — PHYSICAL THERAPY NOTE
PHYSICAL THERAPY NOTE  Patient Name: Christopher Alejandro  [id number] Date: 10/19/2017     10/19/17 1800   Pain Assessment   Pain Assessment 0-10   Pain Score 3   Pain Type Surgical pain   Patient's Stated Pain Goal No pain   Hospital Pain Intervention(s) Repositioned; Emotional support; Ambulation/increased activity; Medication (See MAR)  (premedicated by nursing prior to session)   Restrictions/Precautions   Weight Bearing Precautions Per Order No   Other Precautions Multiple lines; Fall Risk;Pain   General   Chart Reviewed Yes   Response to Previous Treatment Patient with no complaints from previous session  Family/Caregiver Present Yes  (wife)   Cognition   Overall Cognitive Status WFL   Orientation Level Oriented X4   Memory Within functional limits   Following Commands Follows all commands and directions without difficulty   Subjective   Subjective Pt reports additional ambulation with son, and reports feeling better since NGT removed   Transfers   Sit to Stand 5  Supervision   Additional items Assist x 1   Stand to Sit 5  Supervision   Additional items Assist x 1   Ambulation/Elevation   Gait pattern Foward flexed; Shuffling;Excessively slow   Gait Assistance 4  Minimal assist  (for 25% of gait, S for remainder)   Additional items Assist x 1;Verbal cues  (gradually upright posture )   Assistive Device (unilateral support of IV pole)   Distance 70'   Balance   Static Sitting Good   Static Standing Fair   Ambulatory Fair -   Higher level balance Side stepping   Higher level balance rep range 10 reps;to the L and to the R  (1-2 UE support for 5' distances, emphasis on upright posture)   Endurance Deficit   Endurance Deficit Yes   Endurance Deficit Description limited amb distance and standing tolerance   Activity Tolerance   Activity Tolerance Patient limited by pain; Patient limited by fatigue   Nurse Made Aware spoke to Gloria Kidd and Alex RNs Exercises   Neuro-Developmental Treatment Sitting;15 reps;AAROM; Bilateral  (shoulder retraction for postural ther ex)   Equipment Use   Comments additional care coordination including obtaining size appropriate slippers, and bed extender adjustments    Assessment   Prognosis Good   Problem List Decreased strength;Decreased endurance; Impaired balance;Decreased mobility; Decreased safety awareness;Decreased skin integrity;Pain   Assessment Pt did make progress this session  Pt was able to amb 2x the distance compared to yesterday, and progressed to unilateral support instead of BUE support  Additionally pt was able to particiapte in longer standing tolerance/balance/ postural activites, being upright for ther ex > 5 min   However, pt still does require intermittent physical A for ambulation, and needs to perform stairs prior to DC  Recommend nursing/staff/family continue pt ambulation with rolling walker in halls throughout the day, and PT focuses on progressing to lesser restrictive device, stairs, posture, and balance activities  Barriers to Discharge Inaccessible home environment   Goals   Patient Goals less pain   STG Expiration Date 10/27/17   Treatment Day 1   Plan   Treatment/Interventions Functional transfer training;LE strengthening/ROM; Elevations; Therapeutic exercise; Endurance training;Cognitive reorientation;Equipment eval/education; Bed mobility;Gait training;Patient/family training;Spoke to nursing   Progress Slow progress, decreased activity tolerance   PT Frequency 5x/wk   Recommendation   Recommendation Home with family support;Home PT   Equipment Recommended (least restrctive device, may be able to progress to cane  )   Skilled PT recommended while in hospital and upon DC to progress pt toward treatment goals   Ana Zavaleta, PT

## 2017-10-19 NOTE — PROGRESS NOTES
Progress note- General Surgery    S: pt had large BM last night after the suppository  Still no flatus but feeling a little bit better  helga clamp trial, no n/v      O:   Vitals:    10/18/17 2024 10/18/17 2323 10/19/17 0300 10/19/17 0721   BP:  134/89 134/83 152/92   Pulse:  93 91 78   Resp:   18 20   Temp:  98 6 °F (37 °C) 98 1 °F (36 7 °C) 98 5 °F (36 9 °C)   TempSrc:   Oral Oral   SpO2: 96% 97% 96% 94%   Weight:       Height:         I/O last 3 completed shifts: In: 5686 9 [I V :5606 9; NG/GT:80]  Out: 4320 [Urine:3750; Emesis/NG output:570]  I/O this shift:  In: -   Out: 375 [Urine:375]      Recent Labs      10/19/17   0518   WBC  8 83   HGB  13 5   K  3 5   BUN  7     Gen- comfortable in chair  Lungs- clear  Heart- reg  abd- soft, NT, ND  Incision with minimal blanching erythema  NG clamped  +bowel sounds all four quadrants    A/P: 64 yo M with SBO 2/2 adhesions, now s/p ex lap, MORIAH on 10/15, slow return of bowel function  -clamp NG, recheck at 10am  If less than 250 cc, then pull NG   If not, then will leave NG in  -suppository again today  -consider PICC/TPN if unable to start nutrition soon  -SQH for dvt ppx  -wash incision daily with soap and water  -PCA for pain- con't today, will stop once able to helga PO meds    Yolanda Costa MD

## 2017-10-20 PROCEDURE — C9113 INJ PANTOPRAZOLE SODIUM, VIA: HCPCS | Performed by: SURGERY

## 2017-10-20 PROCEDURE — 97116 GAIT TRAINING THERAPY: CPT

## 2017-10-20 RX ORDER — DEXTROSE, SODIUM CHLORIDE, AND POTASSIUM CHLORIDE 5; .45; .15 G/100ML; G/100ML; G/100ML
75 INJECTION INTRAVENOUS CONTINUOUS
Status: DISCONTINUED | OUTPATIENT
Start: 2017-10-20 | End: 2017-10-22 | Stop reason: HOSPADM

## 2017-10-20 RX ADMIN — DEXTROSE, SODIUM CHLORIDE, AND POTASSIUM CHLORIDE 125 ML/HR: 5; .45; .15 INJECTION INTRAVENOUS at 00:13

## 2017-10-20 RX ADMIN — OXYCODONE HYDROCHLORIDE AND ACETAMINOPHEN 1 TABLET: 5; 325 TABLET ORAL at 10:50

## 2017-10-20 RX ADMIN — HEPARIN SODIUM 5000 UNITS: 5000 INJECTION, SOLUTION INTRAVENOUS; SUBCUTANEOUS at 06:13

## 2017-10-20 RX ADMIN — DEXTROSE, SODIUM CHLORIDE, AND POTASSIUM CHLORIDE 125 ML/HR: 5; .45; .15 INJECTION INTRAVENOUS at 07:57

## 2017-10-20 RX ADMIN — ACETAMINOPHEN 650 MG: 325 TABLET ORAL at 06:19

## 2017-10-20 RX ADMIN — HEPARIN SODIUM 5000 UNITS: 5000 INJECTION, SOLUTION INTRAVENOUS; SUBCUTANEOUS at 21:20

## 2017-10-20 RX ADMIN — OXYCODONE HYDROCHLORIDE AND ACETAMINOPHEN 1 TABLET: 5; 325 TABLET ORAL at 14:52

## 2017-10-20 RX ADMIN — PANTOPRAZOLE SODIUM 40 MG: 40 INJECTION, POWDER, FOR SOLUTION INTRAVENOUS at 08:00

## 2017-10-20 RX ADMIN — DEXTROSE, SODIUM CHLORIDE, AND POTASSIUM CHLORIDE 75 ML/HR: 5; .45; .15 INJECTION INTRAVENOUS at 20:04

## 2017-10-20 RX ADMIN — DEXTROSE, SODIUM CHLORIDE, AND POTASSIUM CHLORIDE 75 ML/HR: 5; .45; .15 INJECTION INTRAVENOUS at 10:52

## 2017-10-20 RX ADMIN — HEPARIN SODIUM 5000 UNITS: 5000 INJECTION, SOLUTION INTRAVENOUS; SUBCUTANEOUS at 13:47

## 2017-10-20 NOTE — PROGRESS NOTES
Progress Note -Surgery DIAMOND Godwin Rm 65 y o  male MRN: [id number]  Unit/Bed#: -01 Encounter: [id number]    ASSESSMENT/PLAN:  Problem List     * (Principal)Small bowel obstruction       Assessment:POD # 5 s/p ex lap, MORIAH, new rash on chest to flanks today, non itchy  Bowel function slow to return, no flatus      Plan:   OOB and ambulate  IS  DC PCA  Consider fulls  Monitor rash, benadryl PRN itchiness        VTE Pharmacologic Prophylaxis: Sequential compression device (Venodyne)  and Heparin    Subjective/Objective     Subjective:   No complaints  Minimal pain   Tolerating clears  Denies N/V  Rash noted on chest extending to flank, non itchy  Objective/Physical Exam: Blood pressure 135/89, pulse 76, temperature 98 3 °F (36 8 °C), temperature source Oral, resp  rate 16, height 6' 1" (1 854 m), weight 93 kg (205 lb 0 4 oz), SpO2 98 %  ,Body mass index is 27 05 kg/m²      General appearance: alert  Heart: regular rate and rhythm, S1, S2 normal, no murmur, click, rub or gallop  Lungs: clear to auscultation bilaterally, small reticular rash on chest to B flanks,   Abdomen: incision C/D/I with staples, soft, +BS  Neurological: normal without focal findings      Current Facility-Administered Medications:     acetaminophen (TYLENOL) tablet 650 mg, 650 mg, Oral, Q6H PRN, Cris Cohen MD, 650 mg at 10/20/17 9338    bisacodyl (DULCOLAX) rectal suppository 10 mg, 10 mg, Rectal, Daily, Cris Cohen MD, 10 mg at 10/19/17 1450    dextrose 5 % and sodium chloride 0 45 % with KCl 20 mEq/L infusion, 125 mL/hr, Intravenous, Continuous, Cris Cohen MD, Last Rate: 125 mL/hr at 10/20/17 0013, 125 mL/hr at 10/20/17 0013    diphenhydrAMINE (BENADRYL) injection 25 mg, 25 mg, Intravenous, Q6H PRN, Collene Goldmann, PA-C, 25 mg at 10/18/17 0223    heparin (porcine) subcutaneous injection 5,000 Units, 5,000 Units, Subcutaneous, Q8H [address], Aminah Kelly PA-C, 5,000 Units at 10/20/17 0613    HYDROmorphone (DILAUDID) 1 mg/mL 50 mL PCA, , Intravenous, Continuous, Chloe Ocampo PA-C    HYDROmorphone (DILAUDID) 1 mg/mL injection 0 5 mg, 0 5 mg, Intravenous, Q2H PRN, Shy More MD    HYDROmorphone (DILAUDID) 1 mg/mL injection 1 mg, 1 mg, Intravenous, Q3H PRN, Shy More MD    Menthol lozenge 5 4 mg, 1 lozenge, Mouth/Throat, Q2H PRN, Chloe Ocampo PA-C, 5 4 mg at 10/18/17 1411    metoclopramide (REGLAN) injection 10 mg, 10 mg, Intravenous, Q6H PRN, Robby Ocampo PA-C, 10 mg at 10/13/17 1953    metoprolol (LOPRESSOR) injection 2 5 mg, 2 5 mg, Intravenous, Q6H PRN, Prasanna Garcia PA-C    nalbuphine (NUBAIN) injection 5 mg, 5 mg, Intravenous, Q3H PRN, Noemy Lang MD, 5 mg at 10/17/17 0333    naloxone (NARCAN) 0 04 mg/mL syringe 0 04 mg, 0 04 mg, Intravenous, Q1MIN PRN, Sandra Hernandez PA-C    ondansetron (ZOFRAN) injection 4 mg, 4 mg, Intravenous, Q4H PRN, Deborah Hernandez MD, 4 mg at 10/13/17 1610    oxyCODONE-acetaminophen (PERCOCET) 5-325 mg per tablet 1 tablet, 1 tablet, Oral, Q4H PRN, Deborah Hernandez MD    oxyCODONE-acetaminophen (PERCOCET) 5-325 mg per tablet 2 tablet, 2 tablet, Oral, Q4H PRN, Deborah Hernandez MD    pantoprazole (PROTONIX) injection 40 mg, 40 mg, Intravenous, Q24H [facility], Deborah Hernandez MD, 40 mg at 10/19/17 0903    phenol (CHLORASEPTIC) 1 4 % mucosal liquid 1 spray, 1 spray, Mouth/Throat, Q2H PRN, Shirley Gutierrez PA-C, 1 spray at 10/18/17 4698      Intake/Output Summary (Last 24 hours) at 10/20/17 0746  Last data filed at 10/20/17 0614   Gross per 24 hour   Intake            261 2 ml   Output             3780 ml   Net          -3518 8 ml

## 2017-10-20 NOTE — MALNUTRITION/BMI
This medical record reflects one or more clinical indicators suggestive of malnutrition and/or morbid obesity  Please indicate the one diagnosis below which you feel best reflects the clinical picture  Malnutrition Findings:   acute illness  unspecified protein calorie malnutrition    BMI Findings:  25-29 9    Body mass index is 27 05 kg/m²  See Nutrition note dated 10/20/2017 for additional details  Completed nutrition assessment is viewable in the nutrition documentation      Unspecified protein calorie malnutrition related to inability for PO as evidenced by >7 day period poor intake; stress factor surgery

## 2017-10-20 NOTE — MEDICAL STUDENT
MEDICAL STUDENT  Inpatient Progress Note for TRAINING ONLY  Not Part of Legal Medical Record       Progress Note - Nichole Herrera 65 y o  male MRN: [id number]    Unit/Bed#: -01 Encounter: [id number]      Assessment:  POD #5 s/p ex lap with MORIAH    Plan:  OOB and ambulate  Continue SI  Continue full liquid with toast diet  Monitor rash  Pain control  Wash incision with soap and water daily    Subjective:   Patient has no pain  Patient tolerated new diet well  No nausea or vomiting  Patient currently has visitors and is in good spirits  He has no complaints  Objective:     Vitals: Blood pressure 142/90, pulse 84, temperature 97 7 °F (36 5 °C), temperature source Oral, resp  rate 20, height 6' 1" (1 854 m), weight 93 kg (205 lb 0 4 oz), SpO2 97 %  ,Body mass index is 27 05 kg/m²  Intake/Output Summary (Last 24 hours) at 10/20/17 1643  Last data filed at 10/20/17 1508   Gross per 24 hour   Intake          3718 72 ml   Output             2950 ml   Net           768 72 ml       Physical Exam: /90   Pulse 84   Temp 97 7 °F (36 5 °C) (Oral)   Resp 20   Ht 6' 1" (1 854 m)   Wt 93 kg (205 lb 0 4 oz)   SpO2 97%   BMI 27 05 kg/m²       General: Alert and oriented x3  No acute distress        Invasive Devices     Peripheral Intravenous Line            Peripheral IV 10/18/17 Right;Upper Arm 2 days                Lab, Imaging and other studies: I have personally reviewed pertinent reports      VTE Pharmacologic Prophylaxis: Heparin  VTE Mechanical Prophylaxis: sequential compression device

## 2017-10-20 NOTE — MEDICAL STUDENT
MEDICAL STUDENT  Inpatient Progress Note for TRAINING ONLY  Not Part of Legal Medical Record       Progress Note - Stormy Escobar 65 y o  male MRN: [id number]    Unit/Bed#: -[place] Encounter: [id number]      Assessment:  POD#5 s/p ex lap with MORIAH  New rash developed this morning  Plan:  OOB and ambulate  Continue SI  Continue Clear liquid diet  Continue to monitor rash  Wash incision with soap and water daily  Pain control    Subjective:   Patient denies pain  Patient reports bowel movement after suppository use yesterday afternoon  Patient reports normal urination  (+) mild headache  (-) N/V/flatus    Objective:     Vitals: Blood pressure 135/89, pulse 76, temperature 98 3 °F (36 8 °C), temperature source Oral, resp  rate 16, height 6' 1" (1 854 m), weight 93 kg (205 lb 0 4 oz), SpO2 98 %  ,Body mass index is 27 05 kg/m²  Intake/Output Summary (Last 24 hours) at 10/20/17 0820  Last data filed at 10/20/17 0757   Gross per 24 hour   Intake          3459 12 ml   Output             3780 ml   Net          -320 88 ml       Physical Exam: General appearance: alert, appears stated age and cooperative  Lungs: clear to auscultation bilaterally  Heart: regular rate and rhythm, S1, S2 normal, no murmur, click, rub or gallop  Abdomen: Abdomen is soft, nontender to palpation  Bowel sounds noted  Mild erythema at superior end of incision  Extremities: extremities normal, atraumatic, no cyanosis or edema   Skin: Diffuse papular rash extending across lower chest and around to bilateral flank areas noted on exam  Rash spares the midline of the abdomen, incision site, and back  Invasive Devices     Peripheral Intravenous Line            Peripheral IV 10/18/17 Right;Upper Arm 1 day                Lab, Imaging and other studies: I have personally reviewed pertinent reports      VTE Pharmacologic Prophylaxis: Sequential compression device (Venodyne)  and Heparin  VTE Mechanical Prophylaxis: sequential compression device

## 2017-10-20 NOTE — PHYSICAL THERAPY NOTE
PHYSICAL THERAPY NOTE    Patient Name: Qamar Khan  CKYEL'M Date: 10/20/2017     10/20/17 1415   Pain Assessment   Pain Assessment No/denies pain   Restrictions/Precautions   Weight Bearing Precautions Per Order No   Other Precautions Multiple lines; Fall Risk;Pain   General   Chart Reviewed Yes   Response to Previous Treatment Patient with no complaints from previous session  Family/Caregiver Present No   Cognition   Overall Cognitive Status WFL   Orientation Level Oriented X4   Memory Within functional limits   Following Commands Follows all commands and directions without difficulty   Subjective   Subjective Pt reports amb in halls with family last night  Agreeable to perform more progressive mobility this session   Transfers   Sit to Stand 5  Supervision   Additional items Assist x 1   Stand to Sit 5  Supervision   Additional items Assist x 1   Ambulation/Elevation   Gait pattern Foward flexed   Gait Assistance 5  Supervision   Additional items Verbal cues  (posture)   Assistive Device Other (Comment)  (unilateral support of IV pole)   Distance 250'+100' --performing walking while talking   Stair Management Assistance 5  Supervision   Additional items Assist x 1;Verbal cues   Stair Management Technique One rail R;Alternating pattern; Foreward;Backward   Number of Stairs 9  (3 steps x 3 trials)   Balance   Static Sitting Good   Static Standing Fair   Ambulatory Fair -   Endurance Deficit   Endurance Deficit Yes   Endurance Deficit Description slow ambulation trials, needing rests between trials   Activity Tolerance   Activity Tolerance Patient limited by fatigue   Nurse Made Aware spoke to [facility]   Assessment   Prognosis Good   Problem List Decreased strength;Decreased endurance; Impaired balance;Decreased mobility; Decreased safety awareness;Decreased skin integrity;Pain   Assessment Pt continues to make progress this session compared to yesterday, and is making effort to mobilize with nursing staff and family during down-times  Pt is amb further distances with unilateral UE support and for more trials  Pt also was able to perform higher level balance activities including stair training w/o physical A and just verbal instruction for using more LE than pulling with UEs  Recommend next session to progress pt to cane support instead of IV pole support, balance activities  Recommend nursing and family continue progress with encouraging endurance trials for ambulation using walker in halls or IV pole support  Will put pt on PRN over weekend for progression to cane  Barriers to Discharge Inaccessible home environment   Goals   Patient Goals better amb   STG Expiration Date 10/27/17   Treatment Day 2   Plan   Treatment/Interventions Functional transfer training;LE strengthening/ROM; Elevations; Endurance training;Cognitive reorientation;Patient/family training;Equipment eval/education; Bed mobility;Gait training; Therapeutic exercise;Spoke to nursing   Progress Progressing toward goals   PT Frequency 5x/wk   Recommendation   Recommendation Home with family support;Home PT   Equipment Recommended (possibly progress to cane)   Addendum: Pt to be seen PRN on weekend for progressing to cane  Skilled PT recommended while in hospital and upon DC to progress pt toward treatment goals     Montez Gloria, PT

## 2017-10-20 NOTE — PLAN OF CARE
Problem: Nutrition/Hydration-ADULT  Goal: Nutrient/Hydration intake appropriate for improving, restoring or maintaining nutritional needs  Monitor and assess patient's nutrition/hydration status for malnutrition (ex- brittle hair, bruises, dry skin, pale skin and conjunctiva, muscle wasting, smooth red tongue, and disorientation)  Collaborate with interdisciplinary team and initiate plan and interventions as ordered  Monitor patient's weight and dietary intake as ordered or per policy  Utilize nutrition screening tool and intervene per policy  Determine patient's food preferences and provide high-protein, high-caloric foods as appropriate       INTERVENTIONS:  - Monitor oral intake, urinary output, labs, and treatment plans  - Assess nutrition and hydration status and recommend course of action  - Evaluate amount of meals eaten  - Assist patient with eating if necessary   - Allow adequate time for meals  - Recommend/ encourage appropriate diets, oral nutritional supplements, and vitamin/mineral supplements  - Order, calculate, and assess calorie counts as needed  - Recommend, monitor, and adjust tube feedings and TPN/PPN based on assessed needs  - Assess need for intravenous fluids  - Provide specific nutrition/hydration education as appropriate  - Include patient/family/caregiver in decisions related to nutrition   Outcome: Progressing  Pt now advanced to full liquids with toast and crackers, tolerating clear liquids

## 2017-10-20 NOTE — PLAN OF CARE
Problem: PHYSICAL THERAPY ADULT  Goal: Performs mobility at highest level of function for planned discharge setting  See evaluation for individualized goals  Treatment/Interventions: Functional transfer training, LE strengthening/ROM, Therapeutic exercise, Endurance training, Patient/family training, Equipment eval/education, Bed mobility, Gait training  Equipment Recommended: Jackie Albarado       See flowsheet documentation for full assessment, interventions and recommendations  Outcome: Progressing  Prognosis: Good  Problem List: Decreased strength, Decreased endurance, Impaired balance, Decreased mobility, Decreased safety awareness, Decreased skin integrity, Pain  Assessment: Pt continues to make progress this session compared to yesterday, and is making effort to mobilize with nursing staff and family during down-times  Pt is amb further distances with unilateral UE support and for more trials  Pt also was able to perform higher level balance activities including stair training w/o physical A and just verbal instruction for using more LE than pulling with UEs  Recommend next session to progress pt to cane support instead of IV pole support, balance activities  Recommend nursing and family continue progress with encouraging endurance trials for ambulation using walker in halls or IV pole support  Will put pt on PRN over weekend for progression to cane  Barriers to Discharge: Inaccessible home environment     Recommendation: Home with family support, Home PT          See flowsheet documentation for full assessment

## 2017-10-20 NOTE — PLAN OF CARE
GASTROINTESTINAL - ADULT     Minimal or absence of nausea and/or vomiting Progressing     Maintains or returns to baseline bowel function Progressing     Maintains adequate nutritional intake Progressing        GENITOURINARY - ADULT     Maintains or returns to baseline urinary function Progressing     Absence of urinary retention Progressing        Knowledge Deficit     Patient/family/caregiver demonstrates understanding of disease process, treatment plan, medications, and discharge instructions Progressing        METABOLIC, FLUID AND ELECTROLYTES - ADULT     Fluid balance maintained Progressing        PAIN - ADULT     Verbalizes/displays adequate comfort level or baseline comfort level Progressing        Potential for Falls     Patient will remain free of falls Progressing        Prexisting or High Potential for Compromised Skin Integrity     Skin integrity is maintained or improved Progressing

## 2017-10-21 PROCEDURE — G8978 MOBILITY CURRENT STATUS: HCPCS | Performed by: PHYSICAL THERAPIST

## 2017-10-21 PROCEDURE — G8979 MOBILITY GOAL STATUS: HCPCS | Performed by: PHYSICAL THERAPIST

## 2017-10-21 PROCEDURE — C9113 INJ PANTOPRAZOLE SODIUM, VIA: HCPCS | Performed by: SURGERY

## 2017-10-21 PROCEDURE — 97116 GAIT TRAINING THERAPY: CPT | Performed by: PHYSICAL THERAPIST

## 2017-10-21 RX ORDER — ACETAMINOPHEN, ASPIRIN AND CAFFEINE 250; 250; 65 MG/1; MG/1; MG/1
1 TABLET, FILM COATED ORAL EVERY 6 HOURS PRN
Status: DISCONTINUED | OUTPATIENT
Start: 2017-10-21 | End: 2017-10-21

## 2017-10-21 RX ORDER — PANTOPRAZOLE SODIUM 40 MG/1
40 TABLET, DELAYED RELEASE ORAL
Status: DISCONTINUED | OUTPATIENT
Start: 2017-10-22 | End: 2017-10-22 | Stop reason: HOSPADM

## 2017-10-21 RX ADMIN — HEPARIN SODIUM 5000 UNITS: 5000 INJECTION, SOLUTION INTRAVENOUS; SUBCUTANEOUS at 05:32

## 2017-10-21 RX ADMIN — DEXTROSE, SODIUM CHLORIDE, AND POTASSIUM CHLORIDE 75 ML/HR: 5; .45; .15 INJECTION INTRAVENOUS at 10:07

## 2017-10-21 RX ADMIN — DIPHENHYDRAMINE HYDROCHLORIDE 25 MG: 50 INJECTION, SOLUTION INTRAMUSCULAR; INTRAVENOUS at 15:35

## 2017-10-21 RX ADMIN — ACETAMINOPHEN 650 MG: 325 TABLET ORAL at 18:56

## 2017-10-21 RX ADMIN — HEPARIN SODIUM 5000 UNITS: 5000 INJECTION, SOLUTION INTRAVENOUS; SUBCUTANEOUS at 14:00

## 2017-10-21 RX ADMIN — DIPHENHYDRAMINE HYDROCHLORIDE 25 MG: 50 INJECTION, SOLUTION INTRAMUSCULAR; INTRAVENOUS at 22:07

## 2017-10-21 RX ADMIN — PANTOPRAZOLE SODIUM 40 MG: 40 INJECTION, POWDER, FOR SOLUTION INTRAVENOUS at 08:52

## 2017-10-21 RX ADMIN — MAGNESIUM HYDROXIDE 30 ML: 400 SUSPENSION ORAL at 10:06

## 2017-10-21 RX ADMIN — HEPARIN SODIUM 5000 UNITS: 5000 INJECTION, SOLUTION INTRAVENOUS; SUBCUTANEOUS at 22:07

## 2017-10-21 RX ADMIN — LISINOPRIL: 20 TABLET ORAL at 10:00

## 2017-10-21 RX ADMIN — DIPHENHYDRAMINE HYDROCHLORIDE 25 MG: 50 INJECTION, SOLUTION INTRAMUSCULAR; INTRAVENOUS at 05:42

## 2017-10-21 NOTE — PLAN OF CARE
Problem: PHYSICAL THERAPY ADULT  Goal: Performs mobility at highest level of function for planned discharge setting  See evaluation for individualized goals  Treatment/Interventions: Functional transfer training, LE strengthening/ROM, Therapeutic exercise, Endurance training, Patient/family training, Equipment eval/education, Bed mobility, Gait training  Equipment Recommended: Linard Bernheim       See flowsheet documentation for full assessment, interventions and recommendations  Outcome: Progressing  Prognosis: Good  Problem List: Decreased strength, Decreased endurance, Impaired balance, Decreased mobility, Pain  Assessment: Toña Venegas continues to show progress  He was able to progress to amb  with SPC, cues needed to right gait pattern  He demonstrated decreased endurance with walking evident upon decreased delisa and sitting rest break needed after walking  Verbally reported fatigue as well  He was show how to use SPC on steps, demonstrated leanring by ability to perfrom stairs safely  Continue to progress with mobility, no AD when appropriate  Barriers to Discharge: Inaccessible home environment     Recommendation: Home with family support          See flowsheet documentation for full assessment

## 2017-10-21 NOTE — PHYSICAL THERAPY NOTE
PHYSICAL THERAPY TREATMENT NOTE     10/21/17 1215   Pain Assessment   Pain Assessment FLACC   Patient's Stated Pain Goal No pain   Hospital Pain Intervention(s) Medication (See MAR)   Pain Rating: FLACC (Rest) - Face 0   Pain Rating: FLACC (Rest) - Legs 0   Pain Rating: FLACC (Rest) - Activity 0   Pain Rating: FLACC (Rest) - Cry 0   Pain Rating: FLACC (Rest) - Consolability 0   Score: FLACC (Rest) 0   Pain Rating: FLACC (Activity) - Face 1   Pain Rating: FLACC (Activity) - Legs 0   Pain Rating: FLACC (Activity) - Activity 0   Pain Rating: FLACC (Activity) - Cry 1   Pain Rating: FLACC (Activity) - Consolability 0   Score: FLACC (Activity) 2   Incision 10/15/17 Abdomen Other (Comment)   Date First Assessed/Time First Assessed: 10/15/17 1412   Location: Abdomen  Wound Location Orientation: Other (Comment)  Wound Description (Comments):  midline stapled  Incision Description Clean;Dry   Closure Staples   Drainage Amount None   Restrictions/Precautions   Weight Bearing Precautions Per Order No   Other Precautions Multiple lines  (IV pole)   General   Chart Reviewed Yes   Family/Caregiver Present No   Cognition   Overall Cognitive Status WFL   Arousal/Participation Alert   Attention Within functional limits   Orientation Level Oriented X4   Memory Within functional limits   Following Commands Follows multistep commands with increased time or repetition   Subjective   Subjective Patient reports feeling well overall, soreness in abdoment   Transfers   Sit to Stand 6  Modified independent  (2 sets)   Additional items Armrests   Stand to Sit 6  Modified independent  (2 sets)   Additional items Armrests   Ambulation/Elevation   Gait pattern (SPC, decreased R step length  3pt gait pattern   decr delisa)   Gait Assistance 6  Modified independent   Assistive Device SPC   Distance 350 ft + 125 ft  (rest break in between - sitting x 5 min)   Stair Management Assistance 6  Modified independent   Stair Management Technique One rail R;Step to pattern  (use of SPC)   Number of Stairs 13   Balance   Static Standing Good   Dynamic Standing Fair +  (mild sway when initiating gait)   Ambulatory Good   Endurance Deficit   Endurance Deficit Yes   Endurance Deficit Description limited activity tolerance   Activity Tolerance   Activity Tolerance Patient tolerated treatment well;Patient limited by fatigue   Nurse Made Aware yes   Assessment   Prognosis Good   Problem List Decreased strength;Decreased endurance; Impaired balance;Decreased mobility;Pain   Assessment Daniel Lisa continues to show progress  He was able to progress to amb  with SPC, cues needed to right gait pattern  He demonstrated decreased endurance with walking evident upon decreased delisa and sitting rest break needed after walking  Verbally reported fatigue as well  He was show how to use SPC on steps, demonstrated leanring by ability to perfrom stairs safely  Continue to progress with mobility, no AD when appropriate  Barriers to Discharge Inaccessible home environment   Goals   Patient Goals less pain with moving   Treatment Day 3   Plan   Treatment/Interventions ADL retraining;LE strengthening/ROM; Elevations; Therapeutic exercise;Gait training   Progress Progressing toward goals   PT Frequency 5x/wk   Recommendation   Recommendation Home with family support   Equipment Recommended Sarah Todd PT      Patient Name: Shirley Huber  AYGPB'N Date: 10/21/2017

## 2017-10-21 NOTE — PLAN OF CARE
Problem: GASTROINTESTINAL - ADULT  Goal: Minimal or absence of nausea and/or vomiting  INTERVENTIONS:  - Administer IV fluids as ordered to ensure adequate hydration  - Maintain NPO status until nausea and vomiting are resolved  - Nasogastric tube as ordered  - Administer ordered antiemetic medications as needed  - Provide nonpharmacologic comfort measures as appropriate  - Advance diet as tolerated, if ordered  - Nutrition services referral to assist patient with adequate nutrition and appropriate food choices   Outcome: Progressing    Goal: Maintains or returns to baseline bowel function  INTERVENTIONS:  - Assess bowel function  - Encourage oral fluids to ensure adequate hydration  - Administer IV fluids as ordered to ensure adequate hydration  - Administer ordered medications as needed  - Encourage mobilization and activity  - Nutrition services referral to assist patient with appropriate food choices   Outcome: Progressing    Goal: Maintains adequate nutritional intake  INTERVENTIONS:  - Monitor percentage of each meal consumed  - Identify factors contributing to decreased intake, treat as appropriate  - Assist with meals as needed  - Monitor I&O, WT and lab values  - Obtain nutrition services referral as needed   Outcome: Progressing      Problem: GENITOURINARY - ADULT  Goal: Maintains or returns to baseline urinary function  INTERVENTIONS:  - Assess urinary function  - Encourage oral fluids to ensure adequate hydration  - Administer IV fluids as ordered to ensure adequate hydration  - Administer ordered medications as needed  - Offer frequent toileting  - Follow urinary retention protocol if ordered   Outcome: Progressing    Goal: Absence of urinary retention  INTERVENTIONS:  - Assess patients ability to void and empty bladder  - Monitor I/O  - Bladder scan as needed  - Discuss with physician/AP medications to alleviate retention as needed  - Discuss catheterization for long term situations as appropriate Outcome: Progressing      Problem: METABOLIC, FLUID AND ELECTROLYTES - ADULT  Goal: Fluid balance maintained  INTERVENTIONS:  - Monitor labs and assess for signs and symptoms of volume excess or deficit  - Monitor I/O and WT  - Instruct patient on fluid and nutrition as appropriate   Outcome: Progressing      Problem: PAIN - ADULT  Goal: Verbalizes/displays adequate comfort level or baseline comfort level  Interventions:  - Encourage patient to monitor pain and request assistance  - Assess pain using appropriate pain scale  - Administer analgesics based on type and severity of pain and evaluate response  - Implement non-pharmacological measures as appropriate and evaluate response  - Consider cultural and social influences on pain and pain management  - Notify physician/advanced practitioner if interventions unsuccessful or patient reports new pain   Outcome: Progressing      Problem: Knowledge Deficit  Goal: Patient/family/caregiver demonstrates understanding of disease process, treatment plan, medications, and discharge instructions  Complete learning assessment and assess knowledge base  Interventions:  - Provide teaching at level of understanding  - Provide teaching via preferred learning methods   Outcome: Progressing      Problem: Potential for Falls  Goal: Patient will remain free of falls  INTERVENTIONS:  - Assess patient frequently for physical needs  -  Identify cognitive and physical deficits and behaviors that affect risk of falls    -  [place] fall precautions as indicated by assessment   - Educate patient/family on patient safety including physical limitations  - Instruct patient to call for assistance with activity based on assessment  - Modify environment to reduce risk of injury  - Consider OT/PT consult to assist with strengthening/mobility   Outcome: Progressing      Problem: Nutrition/Hydration-ADULT  Goal: Nutrient/Hydration intake appropriate for improving, restoring or maintaining nutritional needs  Monitor and assess patient's nutrition/hydration status for malnutrition (ex- brittle hair, bruises, dry skin, pale skin and conjunctiva, muscle wasting, smooth red tongue, and disorientation)  Collaborate with interdisciplinary team and initiate plan and interventions as ordered  Monitor patient's weight and dietary intake as ordered or per policy  Utilize nutrition screening tool and intervene per policy  Determine patient's food preferences and provide high-protein, high-caloric foods as appropriate       INTERVENTIONS:  - Monitor oral intake, urinary output, labs, and treatment plans  - Assess nutrition and hydration status and recommend course of action  - Evaluate amount of meals eaten  - Assist patient with eating if necessary   - Allow adequate time for meals  - Recommend/ encourage appropriate diets, oral nutritional supplements, and vitamin/mineral supplements  - Order, calculate, and assess calorie counts as needed  - Recommend, monitor, and adjust tube feedings and TPN/PPN based on assessed needs  - Assess need for intravenous fluids  - Provide specific nutrition/hydration education as appropriate  - Include patient/family/caregiver in decisions related to nutrition   Outcome: Progressing      Problem: DISCHARGE PLANNING - CARE MANAGEMENT  Goal: Discharge to post-acute care or home with appropriate resources  INTERVENTIONS:  - Conduct assessment to determine patient/family and health care team treatment goals, and need for post-acute services based on payer coverage, community resources, and patient preferences, and barriers to discharge  - Address psychosocial, clinical, and financial barriers to discharge as identified in assessment in conjunction with the patient/family and health care team  - Arrange appropriate level of post-acute services according to patients   needs and preference and payer coverage in collaboration with the physician and health care team  - Communicate with and update the patient/family, physician, and health care team regarding progress on the discharge plan  - Arrange appropriate transportation to post-acute venues   Outcome: Progressing      Problem: Prexisting or High Potential for Compromised Skin Integrity  Goal: Skin integrity is maintained or improved  INTERVENTIONS:  - Identify patients at risk for skin breakdown  - Assess and monitor skin integrity  - Assess and monitor nutrition and hydration status  - Monitor labs (i e  albumin)  - Assess for incontinence   - Turn and reposition patient  - Assist with mobility/ambulation  - Relieve pressure over bony prominences  - Avoid friction and shearing  - Provide appropriate hygiene as needed including keeping skin clean and dry  - Evaluate need for skin moisturizer/barrier cream  - Collaborate with interdisciplinary team (i e  Nutrition, Rehabilitation, etc )   - Patient/family teaching   Outcome: Progressing

## 2017-10-21 NOTE — PROGRESS NOTES
The pantoprazole has  been converted to Oral per [facility] IV-to-PO Auto-Conversion Protocol for Adults as approved by the Pharmacy and Therapeutics Committee  The patient met all eligible criteria:  3 Age = 26years old   2) Received at least one dose of the IV form   3) Receiving at least one other scheduled oral/enteral medication   4) Tolerating an oral/enteral diet   and did not have any exclusions:   1) Critical care patient   2) Active GI bleed (IF assessing H2RAs or PPIs)   3) Continuous tube feeding (IF assessing cipro, doxycycline, levofloxacin, minocycline, rifampin, or voriconazole)   4) Receiving PO vancomycin (IF assessing metronidazole)   5) Persistent nausea and/or vomiting   6) Ileus or gastrointestinal obstruction   7) Zenaida/nasogastric tube set for continuous suction   8) Specific order not to automatically convert to PO (in the order's comments or if discussed in the most recent Infectious Disease or primary team's progress notes)

## 2017-10-21 NOTE — PROGRESS NOTES
Progress Note -Surgery PA  Elinor Dominguez 65 y o  male MRN: [id number]  Unit/Bed#: -01 Encounter: [id number]      Subjective/Objective     Subjective:  Patient very tired this morning  Patient states he could not sleep last night and has recently given Benadryl  Rash on chest per patient does not appear to be improved though he denies any pruritus  Patient is not passing gas and has not had a bowel movement  Patient tolerating full liquid diets with toast and crackers though he does admit he has a decreased appetite      Objective:     /92   Pulse 80   Temp 98 °F (36 7 °C) (Oral)   Resp 18   Ht 6' 1" (1 854 m)   Wt 93 kg (205 lb 0 4 oz)   SpO2 97%   BMI 27 05 kg/m²       Intake/Output Summary (Last 24 hours) at 10/21/17 0842  Last data filed at 10/21/17 7901   Gross per 24 hour   Intake          1688 75 ml   Output             1850 ml   Net          -161 25 ml       Invasive Devices     Peripheral Intravenous Line            Peripheral IV 10/18/17 Right;Upper Arm 2 days                Physical Exam:  General appearance: alert, appears stated age and cooperative  Lungs: clear to auscultation bilaterally  Heart: regular rate and rhythm, S1, S2 normal, no murmur, click, rub or gallop  Abdomen: soft, non-tender; bowel sounds normal; no masses,  no organomegaly, incision clean, dry, intact  Extremities: extremities normal, atraumatic, no cyanosis or edema      Current Facility-Administered Medications:     acetaminophen (TYLENOL) tablet 650 mg, 650 mg, Oral, Q6H PRN, Sal Rodriguez MD, 650 mg at 10/20/17 3226    aspirin-acetaminophen-caffeine (EXCEDRIN MIGRAINE) per tablet 1 tablet, 1 tablet, Oral, Q6H PRN, Chloe Ocampo PA-C    bisacodyl (DULCOLAX) rectal suppository 10 mg, 10 mg, Rectal, Daily, Sal Rodriguez MD, 10 mg at 10/19/17 1450    dextrose 5 % and sodium chloride 0 45 % with KCl 20 mEq/L infusion, 75 mL/hr, Intravenous, Continuous, Leodan Mendiola DO, Last Rate: 75 mL/hr at 10/20/17 2004, 75 mL/hr at 10/20/17 2004    diphenhydrAMINE (BENADRYL) injection 25 mg, 25 mg, Intravenous, Q6H PRN, Angeles Pacheco PA-C, 25 mg at 10/21/17 0542    heparin (porcine) subcutaneous injection 5,000 Units, 5,000 Units, Subcutaneous, Q8H [address], Chloe Ocampo PA-C, 5,000 Units at 10/21/17 0532    HYDROmorphone (DILAUDID) 1 mg/mL injection 0 5 mg, 0 5 mg, Intravenous, Q2H PRN, Keith Grove MD    HYDROmorphone (DILAUDID) 1 mg/mL injection 1 mg, 1 mg, Intravenous, Q3H PRN, Keith Grove MD    lisinopril-hydrochlorothiazide (PRINZIDE 20/25) combo dose, , Oral, Daily, Chloe Ocampo PA-C    Menthol lozenge 5 4 mg, 1 lozenge, Mouth/Throat, Q2H PRN, Chloe Ocampo PA-C, 5 4 mg at 10/18/17 1411    metoclopramide (REGLAN) injection 10 mg, 10 mg, Intravenous, Q6H PRN, Patricia Ocampo PA-C, 10 mg at 10/13/17 1953    metoprolol (LOPRESSOR) injection 2 5 mg, 2 5 mg, Intravenous, Q6H PRN, Marley Gentile PA-C    nalbuphine (NUBAIN) injection 5 mg, 5 mg, Intravenous, Q3H PRN, Tiffany Morejon MD, 5 mg at 10/17/17 0333    naloxone (NARCAN) 0 04 mg/mL syringe 0 04 mg, 0 04 mg, Intravenous, Q1MIN PRN, Thierry Varghese PA-C    ondansetron (ZOFRAN) injection 4 mg, 4 mg, Intravenous, Q4H PRN, Keily Rodriguez MD, 4 mg at 10/13/17 1610    oxyCODONE-acetaminophen (PERCOCET) 5-325 mg per tablet 1 tablet, 1 tablet, Oral, Q4H PRN, Keily Rodriguez MD, 1 tablet at 10/20/17 1452    oxyCODONE-acetaminophen (PERCOCET) 5-325 mg per tablet 2 tablet, 2 tablet, Oral, Q4H PRN, Keily Rodriguez MD    pantoprazole (PROTONIX) injection 40 mg, 40 mg, Intravenous, Q24H [address], Keily Rodriguez MD, 40 mg at 10/20/17 0800    phenol (CHLORASEPTIC) 1 4 % mucosal liquid 1 spray, 1 spray, Mouth/Throat, Q2H PRN, Angeles Pacheco PA-C, 1 spray at 10/18/17 2218              Lab, Imaging and other studies:I have personally reviewed pertinent lab results    , CBC: No results found for: WBC, HGB, HCT, MCV, PLT, ADJUSTEDWBC, MCH, MCHC, RDW, MPV, NRBC, CMP: No results found for: NA, K, CL, CO2, ANIONGAP, BUN, CREATININE, GLUCOSE, CALCIUM, AST, ALT, ALKPHOS, PROT, ALBUMIN, BILITOT, EGFR  Labs in chart were reviewed  Lab Results   Component Value Date    WBC 8 83 10/19/2017    HGB 13 5 10/19/2017    HCT 41 3 10/19/2017     10/19/2017     Lab Results   Component Value Date     (L) 10/19/2017    K 3 5 10/19/2017    CL 98 (L) 10/19/2017    CO2 28 10/19/2017    BUN 7 10/19/2017    CREATININE 0 57 (L) 10/19/2017    GLUCOSE 120 10/19/2017       VTE Pharmacologic Prophylaxis: Heparin  VTE Mechanical Prophylaxis: sequential compression device    Assessment/plan:    60-year-old male postop day 6  Status post ex lap, lysis of adhesions  -will continue fulls,toast, crackers for today due to patient having low appetite and no bowel function yet  If patient has bowel movement today, would increase diet to a surgical soft   -continue to encourage out of bed/ambulation    Rash on chest  -continue Benadryl p r n      Hypertension  -will reorder home p o  medications    Nahomy Chacko PA-C

## 2017-10-22 VITALS
BODY MASS INDEX: 27.17 KG/M2 | DIASTOLIC BLOOD PRESSURE: 90 MMHG | WEIGHT: 205.03 LBS | OXYGEN SATURATION: 96 % | HEIGHT: 73 IN | HEART RATE: 70 BPM | SYSTOLIC BLOOD PRESSURE: 132 MMHG | TEMPERATURE: 98.5 F | RESPIRATION RATE: 18 BRPM

## 2017-10-22 LAB
ANION GAP SERPL CALCULATED.3IONS-SCNC: 9 MMOL/L (ref 4–13)
BUN SERPL-MCNC: 7 MG/DL (ref 5–25)
CALCIUM SERPL-MCNC: 9.3 MG/DL (ref 8.3–10.1)
CHLORIDE SERPL-SCNC: 101 MMOL/L (ref 100–108)
CO2 SERPL-SCNC: 28 MMOL/L (ref 21–32)
CREAT SERPL-MCNC: 0.72 MG/DL (ref 0.6–1.3)
ERYTHROCYTE [DISTWIDTH] IN BLOOD BY AUTOMATED COUNT: 13.1 % (ref 11.6–15.1)
GFR SERPL CREATININE-BSD FRML MDRD: 104 ML/MIN/1.73SQ M
GLUCOSE SERPL-MCNC: 99 MG/DL (ref 65–140)
HCT VFR BLD AUTO: 42.3 % (ref 36.5–49.3)
HGB BLD-MCNC: 13.8 G/DL (ref 12–17)
MCH RBC QN AUTO: 28.7 PG (ref 26.8–34.3)
MCHC RBC AUTO-ENTMCNC: 32.6 G/DL (ref 31.4–37.4)
MCV RBC AUTO: 88 FL (ref 82–98)
PLATELET # BLD AUTO: 382 THOUSANDS/UL (ref 149–390)
PMV BLD AUTO: 8.8 FL (ref 8.9–12.7)
POTASSIUM SERPL-SCNC: 3.5 MMOL/L (ref 3.5–5.3)
RBC # BLD AUTO: 4.81 MILLION/UL (ref 3.88–5.62)
SODIUM SERPL-SCNC: 138 MMOL/L (ref 136–145)
WBC # BLD AUTO: 5.7 THOUSAND/UL (ref 4.31–10.16)

## 2017-10-22 PROCEDURE — 85027 COMPLETE CBC AUTOMATED: CPT | Performed by: PHYSICIAN ASSISTANT

## 2017-10-22 PROCEDURE — 80048 BASIC METABOLIC PNL TOTAL CA: CPT | Performed by: PHYSICIAN ASSISTANT

## 2017-10-22 RX ORDER — OXYCODONE HYDROCHLORIDE AND ACETAMINOPHEN 5; 325 MG/1; MG/1
1 TABLET ORAL EVERY 4 HOURS PRN
Qty: 30 TABLET | Refills: 0 | Status: SHIPPED | OUTPATIENT
Start: 2017-10-22 | End: 2017-11-01

## 2017-10-22 RX ADMIN — HEPARIN SODIUM 5000 UNITS: 5000 INJECTION, SOLUTION INTRAVENOUS; SUBCUTANEOUS at 05:16

## 2017-10-22 RX ADMIN — LISINOPRIL: 20 TABLET ORAL at 08:44

## 2017-10-22 RX ADMIN — DEXTROSE, SODIUM CHLORIDE, AND POTASSIUM CHLORIDE 75 ML/HR: 5; .45; .15 INJECTION INTRAVENOUS at 00:26

## 2017-10-22 RX ADMIN — PANTOPRAZOLE SODIUM 40 MG: 40 TABLET, DELAYED RELEASE ORAL at 05:16

## 2017-10-22 NOTE — PLAN OF CARE
Problem: GASTROINTESTINAL - ADULT  Goal: Minimal or absence of nausea and/or vomiting  INTERVENTIONS:  - Administer IV fluids as ordered to ensure adequate hydration  - Maintain NPO status until nausea and vomiting are resolved  - Nasogastric tube as ordered  - Administer ordered antiemetic medications as needed  - Provide nonpharmacologic comfort measures as appropriate  - Advance diet as tolerated, if ordered  - Nutrition services referral to assist patient with adequate nutrition and appropriate food choices   Outcome: Progressing    Goal: Maintains or returns to baseline bowel function  INTERVENTIONS:  - Assess bowel function  - Encourage oral fluids to ensure adequate hydration  - Administer IV fluids as ordered to ensure adequate hydration  - Administer ordered medications as needed  - Encourage mobilization and activity  - Nutrition services referral to assist patient with appropriate food choices   Outcome: Progressing    Goal: Maintains adequate nutritional intake  INTERVENTIONS:  - Monitor percentage of each meal consumed  - Identify factors contributing to decreased intake, treat as appropriate  - Assist with meals as needed  - Monitor I&O, WT and lab values  - Obtain nutrition services referral as needed   Outcome: Progressing      Problem: GENITOURINARY - ADULT  Goal: Maintains or returns to baseline urinary function  INTERVENTIONS:  - Assess urinary function  - Encourage oral fluids to ensure adequate hydration  - Administer IV fluids as ordered to ensure adequate hydration  - Administer ordered medications as needed  - Offer frequent toileting  - Follow urinary retention protocol if ordered   Outcome: Progressing    Goal: Absence of urinary retention  INTERVENTIONS:  - Assess patients ability to void and empty bladder  - Monitor I/O  - Bladder scan as needed  - Discuss with physician/AP medications to alleviate retention as needed  - Discuss catheterization for long term situations as appropriate Outcome: Progressing      Problem: METABOLIC, FLUID AND ELECTROLYTES - ADULT  Goal: Fluid balance maintained  INTERVENTIONS:  - Monitor labs and assess for signs and symptoms of volume excess or deficit  - Monitor I/O and WT  - Instruct patient on fluid and nutrition as appropriate   Outcome: Progressing      Problem: PAIN - ADULT  Goal: Verbalizes/displays adequate comfort level or baseline comfort level  Interventions:  - Encourage patient to monitor pain and request assistance  - Assess pain using appropriate pain scale  - Administer analgesics based on type and severity of pain and evaluate response  - Implement non-pharmacological measures as appropriate and evaluate response  - Consider cultural and social influences on pain and pain management  - Notify physician/advanced practitioner if interventions unsuccessful or patient reports new pain   Outcome: Progressing      Problem: Knowledge Deficit  Goal: Patient/family/caregiver demonstrates understanding of disease process, treatment plan, medications, and discharge instructions  Complete learning assessment and assess knowledge base  Interventions:  - Provide teaching at level of understanding  - Provide teaching via preferred learning methods   Outcome: Progressing      Problem: Potential for Falls  Goal: Patient will remain free of falls  INTERVENTIONS:  - Assess patient frequently for physical needs  -  Identify cognitive and physical deficits and behaviors that affect risk of falls    -  [place] fall precautions as indicated by assessment   - Educate patient/family on patient safety including physical limitations  - Instruct patient to call for assistance with activity based on assessment  - Modify environment to reduce risk of injury  - Consider OT/PT consult to assist with strengthening/mobility   Outcome: Progressing      Problem: Nutrition/Hydration-ADULT  Goal: Nutrient/Hydration intake appropriate for improving, restoring or maintaining nutritional needs  Monitor and assess patient's nutrition/hydration status for malnutrition (ex- brittle hair, bruises, dry skin, pale skin and conjunctiva, muscle wasting, smooth red tongue, and disorientation)  Collaborate with interdisciplinary team and initiate plan and interventions as ordered  Monitor patient's weight and dietary intake as ordered or per policy  Utilize nutrition screening tool and intervene per policy  Determine patient's food preferences and provide high-protein, high-caloric foods as appropriate       INTERVENTIONS:  - Monitor oral intake, urinary output, labs, and treatment plans  - Assess nutrition and hydration status and recommend course of action  - Evaluate amount of meals eaten  - Assist patient with eating if necessary   - Allow adequate time for meals  - Recommend/ encourage appropriate diets, oral nutritional supplements, and vitamin/mineral supplements  - Order, calculate, and assess calorie counts as needed  - Recommend, monitor, and adjust tube feedings and TPN/PPN based on assessed needs  - Assess need for intravenous fluids  - Provide specific nutrition/hydration education as appropriate  - Include patient/family/caregiver in decisions related to nutrition   Outcome: Progressing      Problem: DISCHARGE PLANNING - CARE MANAGEMENT  Goal: Discharge to post-acute care or home with appropriate resources  INTERVENTIONS:  - Conduct assessment to determine patient/family and health care team treatment goals, and need for post-acute services based on payer coverage, community resources, and patient preferences, and barriers to discharge  - Address psychosocial, clinical, and financial barriers to discharge as identified in assessment in conjunction with the patient/family and health care team  - Arrange appropriate level of post-acute services according to patients   needs and preference and payer coverage in collaboration with the physician and health care team  - Communicate with and update the patient/family, physician, and health care team regarding progress on the discharge plan  - Arrange appropriate transportation to post-acute venues   Outcome: Progressing      Problem: Prexisting or High Potential for Compromised Skin Integrity  Goal: Skin integrity is maintained or improved  INTERVENTIONS:  - Identify patients at risk for skin breakdown  - Assess and monitor skin integrity  - Assess and monitor nutrition and hydration status  - Monitor labs (i e  albumin)  - Assess for incontinence   - Turn and reposition patient  - Assist with mobility/ambulation  - Relieve pressure over bony prominences  - Avoid friction and shearing  - Provide appropriate hygiene as needed including keeping skin clean and dry  - Evaluate need for skin moisturizer/barrier cream  - Collaborate with interdisciplinary team (i e  Nutrition, Rehabilitation, etc )   - Patient/family teaching   Outcome: Progressing

## 2017-10-22 NOTE — DISCHARGE INSTRUCTIONS
Eat surgical soft diet for 2 weeks  This includes anything that can be placed in a , pasta, rice, bread, applesauce, pudding, soups, etc   Do not eat steak, pork, salads for 2 weeks  Shower daily  Do not scrub at incision site  Call Dr Efrain Burkitt office at time of discharge to schedule follow-up appointment in 2 weeks  Do not drive while taking narcotic pain medication  Acute Nausea and Vomiting, Ambulatory Care   GENERAL INFORMATION:   Acute nausea and vomiting  starts suddenly, gets worse quickly, and lasts a short time  Nausea and vomiting may be caused by pregnancy, alcohol, infection, or medicines  Common related symptoms include the following:   · Fever    · Abdominal swelling    · Pain, tenderness, or a lump in the abdomen    · Splashing sounds heard in your stomach when you move  Seek immediate care for the following symptoms:   · Blood in your vomit or bowel movements    · Sudden, severe pain in your chest and upper abdomen after hard vomiting    · Dizziness, dry mouth, and thirst    · Urinating very little or not at all    · Muscle weakness, leg cramps, and trouble breathing    · A heart beat that is faster than normal    · Vomiting for more than 48 hours  Treatment for acute nausea and vomiting  may include medicines to calm your stomach and stop the vomiting  You may need IV fluids if you are dehydrated  Manage your nausea and vomiting:   · Drink liquids as directed to prevent dehydration  Ask how much liquid to drink each day and which liquids are best for you  You may need to drink an oral rehydration solution (ORS)  ORS contains water, salts, and sugar that are needed to replace the lost body fluids  Ask what kind of ORS to use, how much to drink, and where to get it  · Eat smaller meals, more often  Eat small amounts of food every 2 to 3 hours, even if you are not hungry  Food in your stomach may help decrease your nausea  · Avoid stress    Find ways to relax and manage your stress  Headaches due to stress may cause nausea and vomiting  Get more rest and sleep  Follow up with your healthcare provider as directed:  Write down your questions so you remember to ask them during your visits  CARE AGREEMENT:   You have the right to help plan your care  Learn about your health condition and how it may be treated  Discuss treatment options with your caregivers to decide what care you want to receive  You always have the right to refuse treatment  The above information is an  only  It is not intended as medical advice for individual conditions or treatments  Talk to your doctor, nurse or pharmacist before following any medical regimen to see if it is safe and effective for you  © 2014 [address] is for End User's use only and may not be sold, redistributed or otherwise used for commercial purposes  All illustrations and images included in CareNotes® are the copyrighted property of [employer]  or [address]

## 2017-10-22 NOTE — PLAN OF CARE
Problem: GASTROINTESTINAL - ADULT  Goal: Minimal or absence of nausea and/or vomiting  INTERVENTIONS:  - Administer IV fluids as ordered to ensure adequate hydration  - Maintain NPO status until nausea and vomiting are resolved  - Nasogastric tube as ordered  - Administer ordered antiemetic medications as needed  - Provide nonpharmacologic comfort measures as appropriate  - Advance diet as tolerated, if ordered  - Nutrition services referral to assist patient with adequate nutrition and appropriate food choices   Outcome: Completed Date Met: 10/22/17    Goal: Maintains or returns to baseline bowel function  INTERVENTIONS:  - Assess bowel function  - Encourage oral fluids to ensure adequate hydration  - Administer IV fluids as ordered to ensure adequate hydration  - Administer ordered medications as needed  - Encourage mobilization and activity  - Nutrition services referral to assist patient with appropriate food choices   Outcome: Completed Date Met: 10/22/17    Goal: Maintains adequate nutritional intake  INTERVENTIONS:  - Monitor percentage of each meal consumed  - Identify factors contributing to decreased intake, treat as appropriate  - Assist with meals as needed  - Monitor I&O, WT and lab values  - Obtain nutrition services referral as needed   Outcome: Completed Date Met: 10/22/17      Problem: GENITOURINARY - ADULT  Goal: Maintains or returns to baseline urinary function  INTERVENTIONS:  - Assess urinary function  - Encourage oral fluids to ensure adequate hydration  - Administer IV fluids as ordered to ensure adequate hydration  - Administer ordered medications as needed  - Offer frequent toileting  - Follow urinary retention protocol if ordered   Outcome: Completed Date Met: 10/22/17    Goal: Absence of urinary retention  INTERVENTIONS:  - Assess patients ability to void and empty bladder  - Monitor I/O  - Bladder scan as needed  - Discuss with physician/AP medications to alleviate retention as needed  - Discuss catheterization for long term situations as appropriate   Outcome: Completed Date Met: 10/22/17      Problem: METABOLIC, FLUID AND ELECTROLYTES - ADULT  Goal: Fluid balance maintained  INTERVENTIONS:  - Monitor labs and assess for signs and symptoms of volume excess or deficit  - Monitor I/O and WT  - Instruct patient on fluid and nutrition as appropriate   Outcome: Completed Date Met: 10/22/17      Problem: PAIN - ADULT  Goal: Verbalizes/displays adequate comfort level or baseline comfort level  Interventions:  - Encourage patient to monitor pain and request assistance  - Assess pain using appropriate pain scale  - Administer analgesics based on type and severity of pain and evaluate response  - Implement non-pharmacological measures as appropriate and evaluate response  - Consider cultural and social influences on pain and pain management  - Notify physician/advanced practitioner if interventions unsuccessful or patient reports new pain   Outcome: Completed Date Met: 10/22/17      Problem: Knowledge Deficit  Goal: Patient/family/caregiver demonstrates understanding of disease process, treatment plan, medications, and discharge instructions  Complete learning assessment and assess knowledge base  Interventions:  - Provide teaching at level of understanding  - Provide teaching via preferred learning methods   Outcome: Completed Date Met: 10/22/17      Problem: Potential for Falls  Goal: Patient will remain free of falls  INTERVENTIONS:  - Assess patient frequently for physical needs  -  Identify cognitive and physical deficits and behaviors that affect risk of falls    -  [place] fall precautions as indicated by assessment   - Educate patient/family on patient safety including physical limitations  - Instruct patient to call for assistance with activity based on assessment  - Modify environment to reduce risk of injury  - Consider OT/PT consult to assist with strengthening/mobility   Outcome: Completed Date Met: 10/22/17      Problem: Nutrition/Hydration-ADULT  Goal: Nutrient/Hydration intake appropriate for improving, restoring or maintaining nutritional needs  Monitor and assess patient's nutrition/hydration status for malnutrition (ex- brittle hair, bruises, dry skin, pale skin and conjunctiva, muscle wasting, smooth red tongue, and disorientation)  Collaborate with interdisciplinary team and initiate plan and interventions as ordered  Monitor patient's weight and dietary intake as ordered or per policy  Utilize nutrition screening tool and intervene per policy  Determine patient's food preferences and provide high-protein, high-caloric foods as appropriate       INTERVENTIONS:  - Monitor oral intake, urinary output, labs, and treatment plans  - Assess nutrition and hydration status and recommend course of action  - Evaluate amount of meals eaten  - Assist patient with eating if necessary   - Allow adequate time for meals  - Recommend/ encourage appropriate diets, oral nutritional supplements, and vitamin/mineral supplements  - Order, calculate, and assess calorie counts as needed  - Recommend, monitor, and adjust tube feedings and TPN/PPN based on assessed needs  - Assess need for intravenous fluids  - Provide specific nutrition/hydration education as appropriate  - Include patient/family/caregiver in decisions related to nutrition   Outcome: Completed Date Met: 10/22/17      Problem: DISCHARGE PLANNING - CARE MANAGEMENT  Goal: Discharge to post-acute care or home with appropriate resources  INTERVENTIONS:  - Conduct assessment to determine patient/family and health care team treatment goals, and need for post-acute services based on payer coverage, community resources, and patient preferences, and barriers to discharge  - Address psychosocial, clinical, and financial barriers to discharge as identified in assessment in conjunction with the patient/family and health care team  - Arrange appropriate level of post-acute services according to patients   needs and preference and payer coverage in collaboration with the physician and health care team  - Communicate with and update the patient/family, physician, and health care team regarding progress on the discharge plan  - Arrange appropriate transportation to post-acute venues   Outcome: Completed Date Met: 10/22/17      Problem: Prexisting or High Potential for Compromised Skin Integrity  Goal: Skin integrity is maintained or improved  INTERVENTIONS:  - Identify patients at risk for skin breakdown  - Assess and monitor skin integrity  - Assess and monitor nutrition and hydration status  - Monitor labs (i e  albumin)  - Assess for incontinence   - Turn and reposition patient  - Assist with mobility/ambulation  - Relieve pressure over bony prominences  - Avoid friction and shearing  - Provide appropriate hygiene as needed including keeping skin clean and dry  - Evaluate need for skin moisturizer/barrier cream  - Collaborate with interdisciplinary team (i e  Nutrition, Rehabilitation, etc )   - Patient/family teaching   Outcome: Completed Date Met: 10/22/17

## 2017-10-22 NOTE — PROGRESS NOTES
Progress Note -Surgery PA  Lotus  65 y o  male MRN: [id number]  Unit/Bed#: -01 Encounter: [id number]      Subjective/Objective     Subjective:  Very lengthy conversation with patient about discharge possibly today  Patient had 2 bowel movements yesterday  Patient denies any nausea or vomiting  Tolerating diet  Patient denies any abdominal pain  PT recommends cane for discharge  Objective:     /90   Pulse 70   Temp 98 5 °F (36 9 °C) (Oral)   Resp 18   Ht 6' 1" (1 854 m)   Wt 93 kg (205 lb 0 4 oz)   SpO2 96%   BMI 27 05 kg/m²       Intake/Output Summary (Last 24 hours) at 10/22/17 4922  Last data filed at 10/22/17 0943   Gross per 24 hour   Intake          2068 75 ml   Output             1000 ml   Net          1068 75 ml       Invasive Devices     Peripheral Intravenous Line            Peripheral IV 10/18/17 Right;Upper Arm 3 days                Physical Exam:  General appearance: alert, appears stated age and cooperative  Lungs: clear to auscultation bilaterally  Heart: regular rate and rhythm, S1, S2 normal, no murmur, click, rub or gallop  Abdomen: soft, non-tender; bowel sounds normal; no masses,  no organomegaly, incision clean, dry, intact  Skin:  Papular rash present on chest and back    Non pruritic      Current Facility-Administered Medications:     acetaminophen (TYLENOL) tablet 650 mg, 650 mg, Oral, Q6H PRN, Latisha Hartley MD, 650 mg at 10/21/17 1856    dextrose 5 % and sodium chloride 0 45 % with KCl 20 mEq/L infusion, 75 mL/hr, Intravenous, Continuous, Leodan Mendiola DO, Last Rate: 75 mL/hr at 10/22/17 0026, 75 mL/hr at 10/22/17 0026    diphenhydrAMINE (BENADRYL) injection 25 mg, 25 mg, Intravenous, Q6H PRN, Dirk Ca PA-C, 25 mg at 10/21/17 2207    heparin (porcine) subcutaneous injection 5,000 Units, 5,000 Units, Subcutaneous, Q8H [address], Chloe Ocampo PA-C, 5,000 Units at 10/22/17 0516    HYDROmorphone (DILAUDID) 1 mg/mL injection 0 5 mg, 0 5 mg, Intravenous, Q2H PRN, Missy Obando MD    HYDROmorphone (DILAUDID) 1 mg/mL injection 1 mg, 1 mg, Intravenous, Q3H PRN, Missy Obando MD    lisinopril-hydrochlorothiazide (PRINZIDE 20/25) combo dose, , Oral, Daily, Chloe Ocampo PA-C    Menthol lozenge 5 4 mg, 1 lozenge, Mouth/Throat, Q2H PRN, Chloe Ocampo PA-C, 5 4 mg at 10/18/17 1411    metoclopramide (REGLAN) injection 10 mg, 10 mg, Intravenous, Q6H PRN, John Senyvrose Ocampo PA-C, 10 mg at 10/13/17 1953    metoprolol (LOPRESSOR) injection 2 5 mg, 2 5 mg, Intravenous, Q6H PRN, Haley Ocasio PA-C    nalbuphine (NUBAIN) injection 5 mg, 5 mg, Intravenous, Q3H PRN, Jose Alfredo MD, 5 mg at 10/17/17 0333    naloxone (NARCAN) 0 04 mg/mL syringe 0 04 mg, 0 04 mg, Intravenous, Q1MIN PRN, Cathy Westbrook PA-C    ondansetron (ZOFRAN) injection 4 mg, 4 mg, Intravenous, Q4H PRN, Kizzy Acuna MD, 4 mg at 10/13/17 1610    oxyCODONE-acetaminophen (PERCOCET) 5-325 mg per tablet 1 tablet, 1 tablet, Oral, Q4H PRN, Kizzy Acuna MD, 1 tablet at 10/20/17 1452    oxyCODONE-acetaminophen (PERCOCET) 5-325 mg per tablet 2 tablet, 2 tablet, Oral, Q4H PRN, Kizzy Acuna MD    pantoprazole (PROTONIX) EC tablet 40 mg, 40 mg, Oral, Early Morning, Chloe Ocampo PA-C, 40 mg at 10/22/17 0516    phenol (CHLORASEPTIC) 1 4 % mucosal liquid 1 spray, 1 spray, Mouth/Throat, Q2H PRN, Ngoc Zelaya PA-C, 1 spray at 10/18/17 7848              Lab, Imaging and other studies:  I have personally reviewed pertinent lab results    , CBC:   Lab Results   Component Value Date    WBC 5 70 10/22/2017    HGB 13 8 10/22/2017    HCT 42 3 10/22/2017    MCV 88 10/22/2017     10/22/2017    MCH 28 7 10/22/2017    MCHC 32 6 10/22/2017    RDW 13 1 10/22/2017    MPV 8 8 (L) 10/22/2017   , CMP:   Lab Results   Component Value Date     10/22/2017    K 3 5 10/22/2017     10/22/2017    CO2 28 10/22/2017    ANIONGAP 9 10/22/2017    BUN 7 10/22/2017    CREATININE 0 72 10/22/2017    GLUCOSE 99 10/22/2017    CALCIUM 9 3 10/22/2017    EGFR 104 10/22/2017     Labs in chart were reviewed    Lab Results   Component Value Date    WBC 5 70 10/22/2017    HGB 13 8 10/22/2017    HCT 42 3 10/22/2017     10/22/2017     Lab Results   Component Value Date     10/22/2017    K 3 5 10/22/2017     10/22/2017    CO2 28 10/22/2017    BUN 7 10/22/2017    CREATININE 0 72 10/22/2017    GLUCOSE 99 10/22/2017       VTE Pharmacologic Prophylaxis: Heparin  VTE Mechanical Prophylaxis: sequential compression device    Assessment:    61-year-old male postop day 7, status post ex lap, lysis of adhesions  -will plan for discharge today  -went in lengthy detail with patient about diet, activity, follow-up  -continue to monitor bowel function  -encourage out of bed/ambulation  -will discharge patient with cane per PT    Lukasz Monday, SOPHIE

## 2017-10-23 NOTE — CASE MANAGEMENT
Notification of Discharge  This is a Notification of Discharge from our facility [address]  Please be advised that this patient has been discharge from our facility  Below you will find the admission and discharge date and time including the patients disposition  PRESENTATION DATE: 10/12/2017  5:26 AM  IP ADMISSION DATE: 10/12/17 0838  DISCHARGE DATE: 10/22/2017 12:57 PM  DISPOSITION: [address] [place] [address] in the [facility] by Handy Aguilar for 2017  Network Utilization Review Department  Phone: [phone]; Fax [phone]  ATTENTION: The Network Utilization Review Department is now centralized for our 7 Facilities  Make a note that we have a new phone and fax numbers for our Department  Please call with any questions or concerns to [phone] and carefully follow the prompts so that you are directed to the right person  All voicemails are confidential  Fax any determinations, approvals, denials, and requests for initial or continue stay review clinical to [phone]  Due to HIGH CALL volume, it would be easier if you could please send faxed requests to expedite your requests and in part, help us provide discharge notifications faster

## 2017-10-24 NOTE — DISCHARGE SUMMARY
Discharge Summary - Tato Shelley 65 y o  male MRN: [id number]    Unit/Bed#: -01 Encounter: [id number]    Admission Date: 10/12/2017   Discharge Date: 10/22/17    Admitting Diagnosis:   Small bowel obstruction [K56 609]  Abdominal pain [R10 9]  Nausea & vomiting [R11 2]    Discharge Diagnoses: Principal Problem:    Small bowel obstruction      Consultations: None    Procedures Performed:   LAPAROTOMY EXPLORATORY, lysis of adhesions, repair of umbilical hernia without mesh by Dr Suellen Callaway on 10/15/17      Hospital Course: Tato Shelley is a 65 y o  male with PMH of HTN, past surgical history of appendectomy admitted for small bowel obstruction  NGT was placed at time of admission  PT was kept NPO with IVF  Pt had two obstruction series done over next two days which revealed persistent small bowel obstruction  Decision was made on 10/15 for patient to go to the OR due to no improvement  Patient had ex lap, lysis of adhesions, and repair of umbilical hernia performed by Dr Suellen Callaway on 10/15  Epidural was placed by anesthesia which was checked daily until removal on 10/18  PCA was started  NGT was removed on 10/19 and pt was started on clears  Diet was slowly progressed  Pt was given MOM on 10/21 in which patient responded with two bowel movements  PT was discharged on 10/22 in good condition with instruction to eat a soft diet for 2 weeks and to follow up with Dr Suellen Callaway in 2 weeks  Condition at Discharge: good     Discharge instructions/Information to patient and family:   See after visit summary for information provided to patient and family  Provisions for Follow-Up Care:  See after visit summary for information related to follow-up care and any pertinent home health orders  Disposition: See After Visit Summary for discharge disposition information  Planned Readmission: No    Discharge Statement   I spent 30 minutes discharging the patient  This time was spent on the day of discharge   I had direct contact with the patient on the day of discharge  Additional documentation is required if more than 30 minutes were spent on discharge  Discharge Medications:  See after visit summary for reconciled discharge medications provided to patient and family          Patricia Rebolledo PA-C

## 2022-03-25 ENCOUNTER — ESTABLISHED COMPREHENSIVE EXAM (OUTPATIENT)
Dept: URBAN - METROPOLITAN AREA CLINIC 6 | Facility: CLINIC | Age: 62
End: 2022-03-25

## 2022-03-25 DIAGNOSIS — Z96.1: ICD-10-CM

## 2022-03-25 PROCEDURE — 92004 COMPRE OPH EXAM NEW PT 1/>: CPT

## 2022-03-25 PROCEDURE — 92015 DETERMINE REFRACTIVE STATE: CPT

## 2022-03-25 ASSESSMENT — VISUAL ACUITY
OU_CC: J1+
OS_SC: 20/20
OD_PH: 20/20
OD_SC: 20/30+2

## 2022-03-25 ASSESSMENT — TONOMETRY
OD_IOP_MMHG: 13
OS_IOP_MMHG: 14

## 2023-03-16 ENCOUNTER — ESTABLISHED COMPREHENSIVE EXAM (OUTPATIENT)
Dept: URBAN - METROPOLITAN AREA CLINIC 6 | Facility: CLINIC | Age: 63
End: 2023-03-16

## 2023-03-16 DIAGNOSIS — Z96.1: ICD-10-CM

## 2023-03-16 PROCEDURE — 92015 DETERMINE REFRACTIVE STATE: CPT | Mod: NC

## 2023-03-16 PROCEDURE — 92014 COMPRE OPH EXAM EST PT 1/>: CPT

## 2023-03-16 ASSESSMENT — TONOMETRY
OS_IOP_MMHG: 13
OD_IOP_MMHG: 11

## 2023-03-16 ASSESSMENT — VISUAL ACUITY
OS_SC: 20/25-2
OD_SC: 20/30

## 2024-01-23 ENCOUNTER — TELEPHONE (OUTPATIENT)
Dept: GASTROENTEROLOGY | Facility: CLINIC | Age: 64
End: 2024-01-23

## 2024-01-23 NOTE — TELEPHONE ENCOUNTER
Patients GI provider:  FORMER [facility] PT (DR. MENJIVAR)    Number to return call: [phone]    Reason for call: Pt wife calling stating pt had a colonoscopy about 10 years ago with [facility] at [facility]. Wants to know when last colonoscopy was and the date he is due. No report in EPIC.     Scheduled procedure/appointment date if applicable:

## 2024-02-02 NOTE — TELEPHONE ENCOUNTER
Pt's wife called to follow up on her earlier call about pt's colonoscopy date. Advised pt she would have to call the [facility] to get the date and provided her with the number

## 2025-03-21 ENCOUNTER — APPOINTMENT (OUTPATIENT)
Dept: RADIOLOGY | Facility: CLINIC | Age: 65
End: 2025-03-21
Payer: COMMERCIAL

## 2025-03-21 ENCOUNTER — OCCMED (OUTPATIENT)
Dept: URGENT CARE | Facility: CLINIC | Age: 65
End: 2025-03-21
Payer: OTHER MISCELLANEOUS

## 2025-03-21 DIAGNOSIS — M79.642 LEFT HAND PAIN: Primary | ICD-10-CM

## 2025-03-21 PROCEDURE — 99284 EMERGENCY DEPT VISIT MOD MDM: CPT | Performed by: PHYSICIAN ASSISTANT

## 2025-03-21 PROCEDURE — G0383 LEV 4 HOSP TYPE B ED VISIT: HCPCS | Performed by: PHYSICIAN ASSISTANT

## 2025-03-21 PROCEDURE — 73130 X-RAY EXAM OF HAND: CPT

## (undated) DEVICE — TRAY FOLEY 16FR URIMETER SURESTEP

## (undated) DEVICE — PROXIMATE SKIN STAPLERS (35 WIDE) CONTAINS 35 STAINLESS STEEL STAPLES (FIXED HEAD): Brand: PROXIMATE

## (undated) DEVICE — VIOLET BRAIDED (POLYGLACTIN 910), SYNTHETIC ABSORBABLE SUTURE: Brand: COATED VICRYL

## (undated) DEVICE — REM POLYHESIVE ADULT PATIENT RETURN ELECTRODE: Brand: VALLEYLAB

## (undated) DEVICE — BULB SYRINGE,IRRIGATION WITH PROTECTIVE CAP: Brand: DOVER

## (undated) DEVICE — 3M™ TEGADERM™ TRANSPARENT FILM DRESSING FRAME STYLE, 1626W, 4 IN X 4-3/4 IN (10 CM X 12 CM), 50/CT 4CT/CASE: Brand: 3M™ TEGADERM™

## (undated) DEVICE — TOWEL SET X-RAY

## (undated) DEVICE — GLOVE SRG BIOGEL ECLIPSE 7

## (undated) DEVICE — POOLE SUCTION HANDLE: Brand: CARDINAL HEALTH

## (undated) DEVICE — STERILE MAJOR GENERAL PACK: Brand: CARDINAL HEALTH

## (undated) DEVICE — GAUZE SPONGES,16 PLY: Brand: CURITY

## (undated) DEVICE — CHLORAPREP HI-LITE 26ML ORANGE

## (undated) DEVICE — SEPRA FILM 6 X 5

## (undated) DEVICE — SUT PDS II 3-0 SH 27 IN Z316H

## (undated) DEVICE — SCD SEQUENTIAL COMPRESSION COMFORT SLEEVE MEDIUM KNEE LENGTH: Brand: KENDALL SCD

## (undated) DEVICE — SUT PDS II 1 CTX 36 IN Z371T

## (undated) DEVICE — LIGHT HANDLE COVER SLEEVE DISP BLUE STELLAR

## (undated) DEVICE — SUT VICRYL 0 REEL 54 IN J287G

## (undated) DEVICE — INTENDED FOR TISSUE SEPARATION, AND OTHER PROCEDURES THAT REQUIRE A SHARP SURGICAL BLADE TO PUNCTURE OR CUT.: Brand: BARD-PARKER SAFETY BLADES SIZE 10, STERILE

## (undated) DEVICE — SUT PDS II 1 CT-1 27 IN Z347H